# Patient Record
Sex: FEMALE | Race: WHITE | NOT HISPANIC OR LATINO | Employment: OTHER
[De-identification: names, ages, dates, MRNs, and addresses within clinical notes are randomized per-mention and may not be internally consistent; named-entity substitution may affect disease eponyms.]

---

## 2017-04-18 ENCOUNTER — RECORDS - HEALTHEAST (OUTPATIENT)
Dept: ADMINISTRATIVE | Facility: OTHER | Age: 62
End: 2017-04-18

## 2017-09-21 ENCOUNTER — RECORDS - HEALTHEAST (OUTPATIENT)
Dept: ADMINISTRATIVE | Facility: OTHER | Age: 62
End: 2017-09-21

## 2017-11-08 ENCOUNTER — HOSPITAL ENCOUNTER (OUTPATIENT)
Dept: MAMMOGRAPHY | Facility: CLINIC | Age: 62
Discharge: HOME | End: 2017-11-08
Attending: FAMILY MEDICINE

## 2017-11-08 DIAGNOSIS — Z12.31 VISIT FOR SCREENING MAMMOGRAM: ICD-10-CM

## 2017-11-13 ENCOUNTER — COMMUNICATION - HEALTHEAST (OUTPATIENT)
Dept: FAMILY MEDICINE | Facility: CLINIC | Age: 62
End: 2017-11-13

## 2017-11-13 ENCOUNTER — OFFICE VISIT - HEALTHEAST (OUTPATIENT)
Dept: FAMILY MEDICINE | Facility: CLINIC | Age: 62
End: 2017-11-13

## 2017-11-13 DIAGNOSIS — I47.10 SUPRAVENTRICULAR TACHYCARDIA (H): ICD-10-CM

## 2017-11-13 DIAGNOSIS — E78.5 HYPERLIPIDEMIA, UNSPECIFIED HYPERLIPIDEMIA TYPE: ICD-10-CM

## 2017-11-13 DIAGNOSIS — R00.2 PALPITATIONS: ICD-10-CM

## 2017-11-13 DIAGNOSIS — K21.9 GASTROESOPHAGEAL REFLUX DISEASE WITHOUT ESOPHAGITIS: ICD-10-CM

## 2017-11-13 DIAGNOSIS — G25.81 RESTLESS LEGS SYNDROME (RLS): ICD-10-CM

## 2017-11-13 DIAGNOSIS — Z00.00 ROUTINE GENERAL MEDICAL EXAMINATION AT A HEALTH CARE FACILITY: ICD-10-CM

## 2017-11-13 DIAGNOSIS — I10 ESSENTIAL HYPERTENSION: ICD-10-CM

## 2017-11-13 LAB
CHOLEST SERPL-MCNC: 169 MG/DL
FASTING STATUS PATIENT QL REPORTED: YES
HDLC SERPL-MCNC: 60 MG/DL
LDLC SERPL CALC-MCNC: 91 MG/DL
TRIGL SERPL-MCNC: 92 MG/DL

## 2017-11-13 ASSESSMENT — MIFFLIN-ST. JEOR: SCORE: 1265.48

## 2017-11-28 ENCOUNTER — COMMUNICATION - HEALTHEAST (OUTPATIENT)
Dept: ADMINISTRATIVE | Facility: CLINIC | Age: 62
End: 2017-11-28

## 2018-04-25 ENCOUNTER — RECORDS - HEALTHEAST (OUTPATIENT)
Dept: ADMINISTRATIVE | Facility: OTHER | Age: 63
End: 2018-04-25

## 2018-05-03 ENCOUNTER — COMMUNICATION - HEALTHEAST (OUTPATIENT)
Dept: FAMILY MEDICINE | Facility: CLINIC | Age: 63
End: 2018-05-03

## 2018-05-08 ENCOUNTER — OFFICE VISIT - HEALTHEAST (OUTPATIENT)
Dept: FAMILY MEDICINE | Facility: CLINIC | Age: 63
End: 2018-05-08

## 2018-05-08 DIAGNOSIS — C43.9 MELANOMA OF SKIN (H): ICD-10-CM

## 2018-05-08 DIAGNOSIS — Z01.818 ENCOUNTER FOR PREOPERATIVE EXAMINATION FOR GENERAL SURGICAL PROCEDURE: ICD-10-CM

## 2018-05-08 LAB
ATRIAL RATE - MUSE: 66 BPM
DIASTOLIC BLOOD PRESSURE - MUSE: NORMAL MMHG
HGB BLD-MCNC: 15 G/DL (ref 12–16)
INTERPRETATION ECG - MUSE: NORMAL
P AXIS - MUSE: 63 DEGREES
PR INTERVAL - MUSE: 150 MS
QRS DURATION - MUSE: 86 MS
QT - MUSE: 400 MS
QTC - MUSE: 419 MS
R AXIS - MUSE: 49 DEGREES
SYSTOLIC BLOOD PRESSURE - MUSE: NORMAL MMHG
T AXIS - MUSE: 70 DEGREES
VENTRICULAR RATE- MUSE: 66 BPM

## 2018-05-08 ASSESSMENT — MIFFLIN-ST. JEOR: SCORE: 1249.15

## 2018-07-19 ENCOUNTER — RECORDS - HEALTHEAST (OUTPATIENT)
Dept: ADMINISTRATIVE | Facility: OTHER | Age: 63
End: 2018-07-19

## 2018-07-23 ENCOUNTER — RECORDS - HEALTHEAST (OUTPATIENT)
Dept: ADMINISTRATIVE | Facility: OTHER | Age: 63
End: 2018-07-23

## 2018-09-21 ENCOUNTER — RECORDS - HEALTHEAST (OUTPATIENT)
Dept: ADMINISTRATIVE | Facility: OTHER | Age: 63
End: 2018-09-21

## 2018-10-09 ENCOUNTER — RECORDS - HEALTHEAST (OUTPATIENT)
Dept: ADMINISTRATIVE | Facility: OTHER | Age: 63
End: 2018-10-09

## 2018-10-17 ENCOUNTER — RECORDS - HEALTHEAST (OUTPATIENT)
Dept: ADMINISTRATIVE | Facility: OTHER | Age: 63
End: 2018-10-17

## 2018-10-24 ENCOUNTER — RECORDS - HEALTHEAST (OUTPATIENT)
Dept: ADMINISTRATIVE | Facility: OTHER | Age: 63
End: 2018-10-24

## 2018-11-14 ENCOUNTER — COMMUNICATION - HEALTHEAST (OUTPATIENT)
Dept: FAMILY MEDICINE | Facility: CLINIC | Age: 63
End: 2018-11-14

## 2018-11-14 ENCOUNTER — HOSPITAL ENCOUNTER (OUTPATIENT)
Dept: MAMMOGRAPHY | Facility: CLINIC | Age: 63
Discharge: HOME | End: 2018-11-14
Attending: FAMILY MEDICINE

## 2018-11-14 ENCOUNTER — OFFICE VISIT - HEALTHEAST (OUTPATIENT)
Dept: FAMILY MEDICINE | Facility: CLINIC | Age: 63
End: 2018-11-14

## 2018-11-14 DIAGNOSIS — Z12.31 VISIT FOR SCREENING MAMMOGRAM: ICD-10-CM

## 2018-11-14 DIAGNOSIS — Z00.00 ENCOUNTER FOR GENERAL ADULT MEDICAL EXAMINATION WITHOUT ABNORMAL FINDINGS: ICD-10-CM

## 2018-11-14 DIAGNOSIS — C44.90 MALIGNANT NEOPLASM OF SKIN: ICD-10-CM

## 2018-11-14 DIAGNOSIS — E78.5 HYPERLIPIDEMIA, UNSPECIFIED HYPERLIPIDEMIA TYPE: ICD-10-CM

## 2018-11-14 DIAGNOSIS — K21.9 GASTROESOPHAGEAL REFLUX DISEASE WITHOUT ESOPHAGITIS: ICD-10-CM

## 2018-11-14 DIAGNOSIS — R00.2 PALPITATIONS: ICD-10-CM

## 2018-11-14 DIAGNOSIS — I47.10 SUPRAVENTRICULAR TACHYCARDIA (H): ICD-10-CM

## 2018-11-14 DIAGNOSIS — C43.9 MELANOMA OF SKIN (H): ICD-10-CM

## 2018-11-14 DIAGNOSIS — C43.62 MALIGNANT MELANOMA OF LEFT UPPER EXTREMITY INCLUDING SHOULDER (H): ICD-10-CM

## 2018-11-14 DIAGNOSIS — Z13.220 ENCOUNTER FOR SCREENING FOR LIPOID DISORDERS: ICD-10-CM

## 2018-11-14 LAB
ALBUMIN SERPL-MCNC: 4.7 G/DL (ref 3.5–5)
ALP SERPL-CCNC: 81 U/L (ref 45–120)
ALT SERPL W P-5'-P-CCNC: 21 U/L (ref 0–45)
ANION GAP SERPL CALCULATED.3IONS-SCNC: 9 MMOL/L (ref 5–18)
AST SERPL W P-5'-P-CCNC: 30 U/L (ref 0–40)
BILIRUB SERPL-MCNC: 0.8 MG/DL (ref 0–1)
BUN SERPL-MCNC: 13 MG/DL (ref 8–22)
CALCIUM SERPL-MCNC: 10.2 MG/DL (ref 8.5–10.5)
CHLORIDE BLD-SCNC: 103 MMOL/L (ref 98–107)
CHOLEST SERPL-MCNC: 218 MG/DL
CO2 SERPL-SCNC: 27 MMOL/L (ref 22–31)
CREAT SERPL-MCNC: 0.72 MG/DL (ref 0.6–1.1)
FASTING STATUS PATIENT QL REPORTED: YES
GFR SERPL CREATININE-BSD FRML MDRD: >60 ML/MIN/1.73M2
GLUCOSE BLD-MCNC: 90 MG/DL (ref 70–125)
HDLC SERPL-MCNC: 73 MG/DL
LDLC SERPL CALC-MCNC: 130 MG/DL
POTASSIUM BLD-SCNC: 4.1 MMOL/L (ref 3.5–5)
PROT SERPL-MCNC: 7.4 G/DL (ref 6–8)
SODIUM SERPL-SCNC: 139 MMOL/L (ref 136–145)
TRIGL SERPL-MCNC: 77 MG/DL

## 2018-11-14 ASSESSMENT — MIFFLIN-ST. JEOR: SCORE: 1223.65

## 2019-01-16 ENCOUNTER — COMMUNICATION - HEALTHEAST (OUTPATIENT)
Dept: SCHEDULING | Facility: CLINIC | Age: 64
End: 2019-01-16

## 2019-01-23 ENCOUNTER — RECORDS - HEALTHEAST (OUTPATIENT)
Dept: ADMINISTRATIVE | Facility: OTHER | Age: 64
End: 2019-01-23

## 2019-04-23 ENCOUNTER — RECORDS - HEALTHEAST (OUTPATIENT)
Dept: ADMINISTRATIVE | Facility: OTHER | Age: 64
End: 2019-04-23

## 2019-10-23 ENCOUNTER — RECORDS - HEALTHEAST (OUTPATIENT)
Dept: ADMINISTRATIVE | Facility: OTHER | Age: 64
End: 2019-10-23

## 2019-10-24 ENCOUNTER — COMMUNICATION - HEALTHEAST (OUTPATIENT)
Dept: FAMILY MEDICINE | Facility: CLINIC | Age: 64
End: 2019-10-24

## 2019-10-24 DIAGNOSIS — R00.2 PALPITATIONS: ICD-10-CM

## 2019-10-24 DIAGNOSIS — E78.5 HYPERLIPIDEMIA, UNSPECIFIED HYPERLIPIDEMIA TYPE: ICD-10-CM

## 2019-10-24 DIAGNOSIS — I47.10 SUPRAVENTRICULAR TACHYCARDIA (H): ICD-10-CM

## 2019-11-15 ENCOUNTER — COMMUNICATION - HEALTHEAST (OUTPATIENT)
Dept: FAMILY MEDICINE | Facility: CLINIC | Age: 64
End: 2019-11-15

## 2019-11-15 ENCOUNTER — OFFICE VISIT - HEALTHEAST (OUTPATIENT)
Dept: FAMILY MEDICINE | Facility: CLINIC | Age: 64
End: 2019-11-15

## 2019-11-15 ENCOUNTER — HOSPITAL ENCOUNTER (OUTPATIENT)
Dept: MAMMOGRAPHY | Facility: CLINIC | Age: 64
Discharge: HOME | End: 2019-11-15
Attending: FAMILY MEDICINE

## 2019-11-15 DIAGNOSIS — G25.81 RESTLESS LEGS SYNDROME (RLS): ICD-10-CM

## 2019-11-15 DIAGNOSIS — Z12.31 VISIT FOR SCREENING MAMMOGRAM: ICD-10-CM

## 2019-11-15 DIAGNOSIS — C43.62 MALIGNANT MELANOMA OF LEFT UPPER EXTREMITY INCLUDING SHOULDER (H): ICD-10-CM

## 2019-11-15 DIAGNOSIS — J30.9 ALLERGIC RHINITIS, UNSPECIFIED SEASONALITY, UNSPECIFIED TRIGGER: ICD-10-CM

## 2019-11-15 DIAGNOSIS — R00.2 PALPITATIONS: ICD-10-CM

## 2019-11-15 DIAGNOSIS — C44.90 MALIGNANT NEOPLASM OF SKIN: ICD-10-CM

## 2019-11-15 DIAGNOSIS — C43.9 MELANOMA OF SKIN (H): ICD-10-CM

## 2019-11-15 DIAGNOSIS — Z78.0 POSTMENOPAUSAL: ICD-10-CM

## 2019-11-15 DIAGNOSIS — Z00.00 ROUTINE GENERAL MEDICAL EXAMINATION AT A HEALTH CARE FACILITY: ICD-10-CM

## 2019-11-15 DIAGNOSIS — I47.10 SUPRAVENTRICULAR TACHYCARDIA (H): ICD-10-CM

## 2019-11-15 DIAGNOSIS — K21.9 GASTROESOPHAGEAL REFLUX DISEASE WITHOUT ESOPHAGITIS: ICD-10-CM

## 2019-11-15 DIAGNOSIS — E78.5 HYPERLIPIDEMIA, UNSPECIFIED HYPERLIPIDEMIA TYPE: ICD-10-CM

## 2019-11-15 LAB
ALBUMIN SERPL-MCNC: 4.3 G/DL (ref 3.5–5)
ALP SERPL-CCNC: 75 U/L (ref 45–120)
ALT SERPL W P-5'-P-CCNC: 24 U/L (ref 0–45)
ANION GAP SERPL CALCULATED.3IONS-SCNC: 8 MMOL/L (ref 5–18)
AST SERPL W P-5'-P-CCNC: 27 U/L (ref 0–40)
BILIRUB SERPL-MCNC: 0.7 MG/DL (ref 0–1)
BUN SERPL-MCNC: 13 MG/DL (ref 8–22)
CALCIUM SERPL-MCNC: 9.5 MG/DL (ref 8.5–10.5)
CHLORIDE BLD-SCNC: 107 MMOL/L (ref 98–107)
CHOLEST SERPL-MCNC: 208 MG/DL
CO2 SERPL-SCNC: 27 MMOL/L (ref 22–31)
CREAT SERPL-MCNC: 0.75 MG/DL (ref 0.6–1.1)
FASTING STATUS PATIENT QL REPORTED: YES
GFR SERPL CREATININE-BSD FRML MDRD: >60 ML/MIN/1.73M2
GLUCOSE BLD-MCNC: 92 MG/DL (ref 70–125)
HDLC SERPL-MCNC: 57 MG/DL
LDLC SERPL CALC-MCNC: 122 MG/DL
POTASSIUM BLD-SCNC: 4.2 MMOL/L (ref 3.5–5)
PROT SERPL-MCNC: 6.7 G/DL (ref 6–8)
SODIUM SERPL-SCNC: 142 MMOL/L (ref 136–145)
TRIGL SERPL-MCNC: 144 MG/DL

## 2019-11-15 ASSESSMENT — MIFFLIN-ST. JEOR: SCORE: 1250.86

## 2019-11-18 ENCOUNTER — COMMUNICATION - HEALTHEAST (OUTPATIENT)
Dept: FAMILY MEDICINE | Facility: CLINIC | Age: 64
End: 2019-11-18

## 2019-11-18 LAB — HCV AB SERPL QL IA: NEGATIVE

## 2019-11-26 ENCOUNTER — RECORDS - HEALTHEAST (OUTPATIENT)
Dept: ADMINISTRATIVE | Facility: OTHER | Age: 64
End: 2019-11-26

## 2019-11-26 ENCOUNTER — RECORDS - HEALTHEAST (OUTPATIENT)
Dept: BONE DENSITY | Facility: CLINIC | Age: 64
End: 2019-11-26

## 2019-11-26 DIAGNOSIS — Z00.00 ENCOUNTER FOR GENERAL ADULT MEDICAL EXAMINATION WITHOUT ABNORMAL FINDINGS: ICD-10-CM

## 2019-11-26 DIAGNOSIS — Z78.0 ASYMPTOMATIC MENOPAUSAL STATE: ICD-10-CM

## 2019-12-02 ENCOUNTER — COMMUNICATION - HEALTHEAST (OUTPATIENT)
Dept: FAMILY MEDICINE | Facility: CLINIC | Age: 64
End: 2019-12-02

## 2020-03-25 ENCOUNTER — COMMUNICATION - HEALTHEAST (OUTPATIENT)
Dept: SCHEDULING | Facility: CLINIC | Age: 65
End: 2020-03-25

## 2020-03-25 ENCOUNTER — OFFICE VISIT - HEALTHEAST (OUTPATIENT)
Dept: FAMILY MEDICINE | Facility: CLINIC | Age: 65
End: 2020-03-25

## 2020-03-25 DIAGNOSIS — J01.00 ACUTE NON-RECURRENT MAXILLARY SINUSITIS: ICD-10-CM

## 2020-04-06 ENCOUNTER — COMMUNICATION - HEALTHEAST (OUTPATIENT)
Dept: FAMILY MEDICINE | Facility: CLINIC | Age: 65
End: 2020-04-06

## 2020-04-21 ENCOUNTER — RECORDS - HEALTHEAST (OUTPATIENT)
Dept: ADMINISTRATIVE | Facility: OTHER | Age: 65
End: 2020-04-21

## 2020-04-27 ENCOUNTER — COMMUNICATION - HEALTHEAST (OUTPATIENT)
Dept: FAMILY MEDICINE | Facility: CLINIC | Age: 65
End: 2020-04-27

## 2020-09-17 ENCOUNTER — OFFICE VISIT - HEALTHEAST (OUTPATIENT)
Dept: INTERNAL MEDICINE | Facility: CLINIC | Age: 65
End: 2020-09-17

## 2020-09-17 DIAGNOSIS — Z78.0 ASYMPTOMATIC POSTMENOPAUSAL STATUS: ICD-10-CM

## 2020-09-17 DIAGNOSIS — M62.89 MUSCLE FATIGUE: ICD-10-CM

## 2020-09-17 DIAGNOSIS — R11.0 NAUSEA: ICD-10-CM

## 2020-09-17 DIAGNOSIS — R11.2 INTRACTABLE VOMITING WITH NAUSEA, UNSPECIFIED VOMITING TYPE: ICD-10-CM

## 2020-09-17 DIAGNOSIS — R53.83 FATIGUE, UNSPECIFIED TYPE: ICD-10-CM

## 2020-09-17 DIAGNOSIS — R07.9 ACUTE CHEST PAIN: ICD-10-CM

## 2020-09-17 LAB
ALBUMIN SERPL-MCNC: 4.4 G/DL (ref 3.5–5)
ALP SERPL-CCNC: 79 U/L (ref 45–120)
ALT SERPL W P-5'-P-CCNC: 28 U/L (ref 0–45)
ANION GAP SERPL CALCULATED.3IONS-SCNC: 10 MMOL/L (ref 5–18)
AST SERPL W P-5'-P-CCNC: 32 U/L (ref 0–40)
BASOPHILS # BLD AUTO: 0 THOU/UL (ref 0–0.2)
BASOPHILS NFR BLD AUTO: 0 % (ref 0–2)
BILIRUB SERPL-MCNC: 0.6 MG/DL (ref 0–1)
BUN SERPL-MCNC: 13 MG/DL (ref 8–22)
C REACTIVE PROTEIN LHE: <0.1 MG/DL (ref 0–0.8)
CALCIUM SERPL-MCNC: 10 MG/DL (ref 8.5–10.5)
CHLORIDE BLD-SCNC: 103 MMOL/L (ref 98–107)
CK SERPL-CCNC: 85 U/L (ref 30–190)
CO2 SERPL-SCNC: 25 MMOL/L (ref 22–31)
CREAT SERPL-MCNC: 0.7 MG/DL (ref 0.6–1.1)
EOSINOPHIL # BLD AUTO: 0.2 THOU/UL (ref 0–0.4)
EOSINOPHIL NFR BLD AUTO: 3 % (ref 0–6)
ERYTHROCYTE [DISTWIDTH] IN BLOOD BY AUTOMATED COUNT: 11.1 % (ref 11–14.5)
ERYTHROCYTE [SEDIMENTATION RATE] IN BLOOD BY WESTERGREN METHOD: 6 MM/HR (ref 0–20)
GFR SERPL CREATININE-BSD FRML MDRD: >60 ML/MIN/1.73M2
GLUCOSE BLD-MCNC: 91 MG/DL (ref 70–125)
HCT VFR BLD AUTO: 42.2 % (ref 35–47)
HGB BLD-MCNC: 14.4 G/DL (ref 12–16)
LYMPHOCYTES # BLD AUTO: 1.5 THOU/UL (ref 0.8–4.4)
LYMPHOCYTES NFR BLD AUTO: 26 % (ref 20–40)
MCH RBC QN AUTO: 32.4 PG (ref 27–34)
MCHC RBC AUTO-ENTMCNC: 34.2 G/DL (ref 32–36)
MCV RBC AUTO: 95 FL (ref 80–100)
MONOCYTES # BLD AUTO: 0.4 THOU/UL (ref 0–0.9)
MONOCYTES NFR BLD AUTO: 7 % (ref 2–10)
NEUTROPHILS # BLD AUTO: 3.6 THOU/UL (ref 2–7.7)
NEUTROPHILS NFR BLD AUTO: 63 % (ref 50–70)
PLATELET # BLD AUTO: 246 THOU/UL (ref 140–440)
PMV BLD AUTO: 7.6 FL (ref 7–10)
POTASSIUM BLD-SCNC: 3.7 MMOL/L (ref 3.5–5)
PROT SERPL-MCNC: 7 G/DL (ref 6–8)
RBC # BLD AUTO: 4.45 MILL/UL (ref 3.8–5.4)
SODIUM SERPL-SCNC: 138 MMOL/L (ref 136–145)
TSH SERPL DL<=0.005 MIU/L-ACNC: 2.06 UIU/ML (ref 0.3–5)
WBC: 5.8 THOU/UL (ref 4–11)

## 2020-09-17 RX ORDER — ONDANSETRON 4 MG/1
4 TABLET, FILM COATED ORAL EVERY 8 HOURS PRN
Qty: 10 TABLET | Refills: 1 | Status: SHIPPED | OUTPATIENT
Start: 2020-09-17 | End: 2021-11-19

## 2020-09-18 ENCOUNTER — COMMUNICATION - HEALTHEAST (OUTPATIENT)
Dept: INTERNAL MEDICINE | Facility: CLINIC | Age: 65
End: 2020-09-18

## 2020-09-18 LAB
ANA SER QL: 0.3 U
ATRIAL RATE - MUSE: 75 BPM
B BURGDOR IGG+IGM SER QL: 0.19 INDEX VALUE
DIASTOLIC BLOOD PRESSURE - MUSE: NORMAL
INTERPRETATION ECG - MUSE: NORMAL
P AXIS - MUSE: 31 DEGREES
PR INTERVAL - MUSE: 130 MS
QRS DURATION - MUSE: 78 MS
QT - MUSE: 394 MS
QTC - MUSE: 439 MS
R AXIS - MUSE: 46 DEGREES
SYSTOLIC BLOOD PRESSURE - MUSE: NORMAL
T AXIS - MUSE: 64 DEGREES
VENTRICULAR RATE- MUSE: 75 BPM

## 2020-10-02 ENCOUNTER — OFFICE VISIT - HEALTHEAST (OUTPATIENT)
Dept: FAMILY MEDICINE | Facility: CLINIC | Age: 65
End: 2020-10-02

## 2020-10-02 DIAGNOSIS — R19.7 DIARRHEA, UNSPECIFIED TYPE: ICD-10-CM

## 2020-10-02 DIAGNOSIS — F43.22 ADJUSTMENT DISORDER WITH ANXIETY: ICD-10-CM

## 2020-10-02 RX ORDER — LORAZEPAM 0.5 MG/1
0.5 TABLET ORAL DAILY PRN
Qty: 20 TABLET | Refills: 0 | Status: SHIPPED | OUTPATIENT
Start: 2020-10-02 | End: 2021-11-19

## 2020-10-02 ASSESSMENT — ANXIETY QUESTIONNAIRES
2. NOT BEING ABLE TO STOP OR CONTROL WORRYING: SEVERAL DAYS
5. BEING SO RESTLESS THAT IT IS HARD TO SIT STILL: MORE THAN HALF THE DAYS
1. FEELING NERVOUS, ANXIOUS, OR ON EDGE: MORE THAN HALF THE DAYS
GAD7 TOTAL SCORE: 11
IF YOU CHECKED OFF ANY PROBLEMS ON THIS QUESTIONNAIRE, HOW DIFFICULT HAVE THESE PROBLEMS MADE IT FOR YOU TO DO YOUR WORK, TAKE CARE OF THINGS AT HOME, OR GET ALONG WITH OTHER PEOPLE: SOMEWHAT DIFFICULT
4. TROUBLE RELAXING: MORE THAN HALF THE DAYS
3. WORRYING TOO MUCH ABOUT DIFFERENT THINGS: SEVERAL DAYS
6. BECOMING EASILY ANNOYED OR IRRITABLE: SEVERAL DAYS
7. FEELING AFRAID AS IF SOMETHING AWFUL MIGHT HAPPEN: MORE THAN HALF THE DAYS

## 2020-10-02 ASSESSMENT — PATIENT HEALTH QUESTIONNAIRE - PHQ9: SUM OF ALL RESPONSES TO PHQ QUESTIONS 1-9: 16

## 2020-10-09 ENCOUNTER — OFFICE VISIT - HEALTHEAST (OUTPATIENT)
Dept: FAMILY MEDICINE | Facility: CLINIC | Age: 65
End: 2020-10-09

## 2020-10-09 DIAGNOSIS — Z23 ENCOUNTER FOR ADMINISTRATION OF VACCINE: ICD-10-CM

## 2020-10-09 DIAGNOSIS — Z79.899 MEDICATION MANAGEMENT: ICD-10-CM

## 2020-10-09 DIAGNOSIS — F41.9 ANXIETY: ICD-10-CM

## 2020-10-09 DIAGNOSIS — F43.21 GRIEF REACTION: ICD-10-CM

## 2020-10-09 ASSESSMENT — MIFFLIN-ST. JEOR: SCORE: 1221.72

## 2020-10-15 ENCOUNTER — COMMUNICATION - HEALTHEAST (OUTPATIENT)
Dept: FAMILY MEDICINE | Facility: CLINIC | Age: 65
End: 2020-10-15

## 2020-10-20 ENCOUNTER — COMMUNICATION - HEALTHEAST (OUTPATIENT)
Dept: FAMILY MEDICINE | Facility: CLINIC | Age: 65
End: 2020-10-20

## 2020-10-22 ENCOUNTER — COMMUNICATION - HEALTHEAST (OUTPATIENT)
Dept: FAMILY MEDICINE | Facility: CLINIC | Age: 65
End: 2020-10-22

## 2020-10-29 ENCOUNTER — COMMUNICATION - HEALTHEAST (OUTPATIENT)
Dept: FAMILY MEDICINE | Facility: CLINIC | Age: 65
End: 2020-10-29

## 2020-11-06 ENCOUNTER — COMMUNICATION - HEALTHEAST (OUTPATIENT)
Dept: FAMILY MEDICINE | Facility: CLINIC | Age: 65
End: 2020-11-06

## 2020-11-09 ENCOUNTER — OFFICE VISIT - HEALTHEAST (OUTPATIENT)
Dept: FAMILY MEDICINE | Facility: CLINIC | Age: 65
End: 2020-11-09

## 2020-11-09 DIAGNOSIS — F41.9 ANXIETY: ICD-10-CM

## 2020-11-09 DIAGNOSIS — I47.10 SUPRAVENTRICULAR TACHYCARDIA (H): ICD-10-CM

## 2020-11-09 DIAGNOSIS — R11.0 NAUSEA: ICD-10-CM

## 2020-11-09 DIAGNOSIS — R00.2 PALPITATIONS: ICD-10-CM

## 2020-11-09 ASSESSMENT — MIFFLIN-ST. JEOR: SCORE: 1196.32

## 2020-11-09 ASSESSMENT — ANXIETY QUESTIONNAIRES
4. TROUBLE RELAXING: NEARLY EVERY DAY
7. FEELING AFRAID AS IF SOMETHING AWFUL MIGHT HAPPEN: MORE THAN HALF THE DAYS
6. BECOMING EASILY ANNOYED OR IRRITABLE: SEVERAL DAYS
GAD7 TOTAL SCORE: 16
2. NOT BEING ABLE TO STOP OR CONTROL WORRYING: MORE THAN HALF THE DAYS
5. BEING SO RESTLESS THAT IT IS HARD TO SIT STILL: NEARLY EVERY DAY
3. WORRYING TOO MUCH ABOUT DIFFERENT THINGS: MORE THAN HALF THE DAYS
1. FEELING NERVOUS, ANXIOUS, OR ON EDGE: NEARLY EVERY DAY
IF YOU CHECKED OFF ANY PROBLEMS ON THIS QUESTIONNAIRE, HOW DIFFICULT HAVE THESE PROBLEMS MADE IT FOR YOU TO DO YOUR WORK, TAKE CARE OF THINGS AT HOME, OR GET ALONG WITH OTHER PEOPLE: SOMEWHAT DIFFICULT

## 2020-11-10 ENCOUNTER — AMBULATORY - HEALTHEAST (OUTPATIENT)
Dept: LAB | Facility: CLINIC | Age: 65
End: 2020-11-10

## 2020-11-10 ENCOUNTER — COMMUNICATION - HEALTHEAST (OUTPATIENT)
Dept: FAMILY MEDICINE | Facility: CLINIC | Age: 65
End: 2020-11-10

## 2020-11-10 DIAGNOSIS — R11.0 NAUSEA: ICD-10-CM

## 2020-11-11 ENCOUNTER — COMMUNICATION - HEALTHEAST (OUTPATIENT)
Dept: FAMILY MEDICINE | Facility: CLINIC | Age: 65
End: 2020-11-11

## 2020-11-11 LAB — H PYLORI AG STL QL IA: NEGATIVE

## 2020-11-12 ENCOUNTER — COMMUNICATION - HEALTHEAST (OUTPATIENT)
Dept: FAMILY MEDICINE | Facility: CLINIC | Age: 65
End: 2020-11-12

## 2020-11-13 ENCOUNTER — RECORDS - HEALTHEAST (OUTPATIENT)
Dept: ADMINISTRATIVE | Facility: OTHER | Age: 65
End: 2020-11-13

## 2020-11-18 ENCOUNTER — OFFICE VISIT - HEALTHEAST (OUTPATIENT)
Dept: FAMILY MEDICINE | Facility: CLINIC | Age: 65
End: 2020-11-18

## 2020-11-18 DIAGNOSIS — C43.62 MALIGNANT MELANOMA OF LEFT UPPER EXTREMITY INCLUDING SHOULDER (H): ICD-10-CM

## 2020-11-18 DIAGNOSIS — F43.21 GRIEF REACTION: ICD-10-CM

## 2020-11-18 DIAGNOSIS — Z79.899 MEDICATION MANAGEMENT: ICD-10-CM

## 2020-11-18 DIAGNOSIS — C44.90 MALIGNANT NEOPLASM OF SKIN: ICD-10-CM

## 2020-11-18 DIAGNOSIS — R00.2 PALPITATIONS: ICD-10-CM

## 2020-11-18 DIAGNOSIS — Z00.01 ENCOUNTER FOR GENERAL ADULT MEDICAL EXAMINATION WITH ABNORMAL FINDINGS: ICD-10-CM

## 2020-11-18 DIAGNOSIS — F41.9 ANXIETY: ICD-10-CM

## 2020-11-18 DIAGNOSIS — E78.5 HYPERLIPIDEMIA, UNSPECIFIED HYPERLIPIDEMIA TYPE: ICD-10-CM

## 2020-11-18 DIAGNOSIS — I47.10 SUPRAVENTRICULAR TACHYCARDIA (H): ICD-10-CM

## 2020-11-18 LAB
ALBUMIN SERPL-MCNC: 4.4 G/DL (ref 3.5–5)
ALP SERPL-CCNC: 62 U/L (ref 45–120)
ALT SERPL W P-5'-P-CCNC: 21 U/L (ref 0–45)
ANION GAP SERPL CALCULATED.3IONS-SCNC: 8 MMOL/L (ref 5–18)
AST SERPL W P-5'-P-CCNC: 22 U/L (ref 0–40)
BILIRUB SERPL-MCNC: 0.5 MG/DL (ref 0–1)
BUN SERPL-MCNC: 14 MG/DL (ref 8–22)
CALCIUM SERPL-MCNC: 9.5 MG/DL (ref 8.5–10.5)
CHLORIDE BLD-SCNC: 105 MMOL/L (ref 98–107)
CHOLEST SERPL-MCNC: 153 MG/DL
CO2 SERPL-SCNC: 26 MMOL/L (ref 22–31)
CREAT SERPL-MCNC: 0.75 MG/DL (ref 0.6–1.1)
FASTING STATUS PATIENT QL REPORTED: YES
GFR SERPL CREATININE-BSD FRML MDRD: >60 ML/MIN/1.73M2
GLUCOSE BLD-MCNC: 92 MG/DL (ref 70–125)
HDLC SERPL-MCNC: 52 MG/DL
LDLC SERPL CALC-MCNC: 65 MG/DL
POTASSIUM BLD-SCNC: 4.1 MMOL/L (ref 3.5–5)
PROT SERPL-MCNC: 6.5 G/DL (ref 6–8)
SODIUM SERPL-SCNC: 139 MMOL/L (ref 136–145)
TRIGL SERPL-MCNC: 180 MG/DL

## 2020-11-18 RX ORDER — SIMVASTATIN 20 MG
20 TABLET ORAL AT BEDTIME
Qty: 90 TABLET | Refills: 3 | Status: SHIPPED | OUTPATIENT
Start: 2020-11-18 | End: 2021-11-16

## 2020-11-18 RX ORDER — CITALOPRAM HYDROBROMIDE 10 MG/1
TABLET ORAL
Qty: 90 TABLET | Refills: 3 | Status: SHIPPED | OUTPATIENT
Start: 2020-11-18 | End: 2021-10-27

## 2020-11-18 RX ORDER — METOPROLOL SUCCINATE 25 MG/1
TABLET, EXTENDED RELEASE ORAL
Qty: 180 TABLET | Refills: 3 | Status: SHIPPED | OUTPATIENT
Start: 2020-11-18 | End: 2021-11-16

## 2020-11-18 ASSESSMENT — PATIENT HEALTH QUESTIONNAIRE - PHQ9: SUM OF ALL RESPONSES TO PHQ QUESTIONS 1-9: 8

## 2020-11-18 ASSESSMENT — MIFFLIN-ST. JEOR: SCORE: 1186.34

## 2020-11-19 ENCOUNTER — COMMUNICATION - HEALTHEAST (OUTPATIENT)
Dept: FAMILY MEDICINE | Facility: CLINIC | Age: 65
End: 2020-11-19

## 2020-11-19 ENCOUNTER — HOSPITAL ENCOUNTER (OUTPATIENT)
Dept: MAMMOGRAPHY | Facility: CLINIC | Age: 65
Discharge: HOME | End: 2020-11-19
Attending: FAMILY MEDICINE

## 2020-11-19 DIAGNOSIS — Z12.31 SCREENING MAMMOGRAM, ENCOUNTER FOR: ICD-10-CM

## 2021-01-20 ENCOUNTER — RECORDS - HEALTHEAST (OUTPATIENT)
Dept: ADMINISTRATIVE | Facility: OTHER | Age: 66
End: 2021-01-20

## 2021-01-25 ENCOUNTER — COMMUNICATION - HEALTHEAST (OUTPATIENT)
Dept: FAMILY MEDICINE | Facility: CLINIC | Age: 66
End: 2021-01-25

## 2021-05-13 ENCOUNTER — RECORDS - HEALTHEAST (OUTPATIENT)
Dept: ADMINISTRATIVE | Facility: OTHER | Age: 66
End: 2021-05-13

## 2021-05-26 ASSESSMENT — PATIENT HEALTH QUESTIONNAIRE - PHQ9: SUM OF ALL RESPONSES TO PHQ QUESTIONS 1-9: 16

## 2021-05-27 ASSESSMENT — PATIENT HEALTH QUESTIONNAIRE - PHQ9: SUM OF ALL RESPONSES TO PHQ QUESTIONS 1-9: 8

## 2021-05-28 ASSESSMENT — ANXIETY QUESTIONNAIRES
GAD7 TOTAL SCORE: 16
GAD7 TOTAL SCORE: 11

## 2021-05-31 VITALS — BODY MASS INDEX: 25.57 KG/M2 | WEIGHT: 159.1 LBS | HEIGHT: 66 IN

## 2021-06-01 VITALS — WEIGHT: 155.5 LBS | HEIGHT: 66 IN | BODY MASS INDEX: 24.99 KG/M2

## 2021-06-02 VITALS — HEIGHT: 65 IN | WEIGHT: 152.5 LBS | BODY MASS INDEX: 25.41 KG/M2

## 2021-06-02 NOTE — TELEPHONE ENCOUNTER
Refill Approved    Rx renewed per Medication Renewal Policy. Medication was last renewed on 11/14/18.    Lorie Moya, [facility] Triage/Med Refill 10/24/2019     Requested Prescriptions   Pending Prescriptions Disp Refills     simvastatin (ZOCOR) 20 MG tablet [Pharmacy Med Name: SIMVASTATIN TABS 20MG] 90 tablet 4     Sig: TAKE 1 TABLET AT BEDTIME       Statins Refill Protocol (Hmg CoA Reductase Inhibitors) Passed - 10/24/2019  2:29 AM        Passed - PCP or prescribing provider visit in past 12 months      Last office visit with prescriber/PCP: Visit date not found OR same dept: Visit date not found OR same specialty: Visit date not found  Last physical: 11/14/2018 Last MTM visit: Visit date not found   Next visit within 3 mo: Visit date not found  Next physical within 3 mo: Visit date not found  Prescriber OR PCP: Blanca Mace MD  Last diagnosis associated with med order: 1. Hyperlipidemia, unspecified hyperlipidemia type  - simvastatin (ZOCOR) 20 MG tablet [Pharmacy Med Name: SIMVASTATIN TABS 20MG]; TAKE 1 TABLET AT BEDTIME.  Dispense: 90 tablet; Refill: 4    2. Palpitations  - TOPROL XL 25 mg [Pharmacy Med Name: TOPROL XL TABS 25MG]; TAKE 1 TABLET TWICE A DAY  Dispense: 180 tablet; Refill: 4    3. Supraventricular tachycardia (H)  - TOPROL XL 25 mg [Pharmacy Med Name: TOPROL XL TABS 25MG]; TAKE 1 TABLET TWICE A DAY  Dispense: 180 tablet; Refill: 4    If protocol passes may refill for 12 months if within 3 months of last provider visit (or a total of 15 months).             metoprolol succinate (TOPROL XL) 25 MG [Pharmacy Med Name: TOPROL XL TABS 25MG] 180 tablet 4     Sig: TAKE 1 TABLET TWICE A DAY       Beta-Blockers Refill Protocol Passed - 10/24/2019  2:29 AM        Passed - PCP or prescribing provider visit in past 12 months or next 3 months     Last office visit with prescriber/PCP: Visit date not found OR same dept: Visit date not found OR same specialty: Visit date not found  Last physical:  11/14/2018 Last MTM visit: Visit date not found   Next visit within 3 mo: Visit date not found  Next physical within 3 mo: Visit date not found  Prescriber OR PCP: Blanca Mace MD  Last diagnosis associated with med order: 1. Hyperlipidemia, unspecified hyperlipidemia type  - simvastatin (ZOCOR) 20 MG tablet [Pharmacy Med Name: SIMVASTATIN TABS 20MG]; TAKE 1 TABLET AT BEDTIME.  Dispense: 90 tablet; Refill: 4    2. Palpitations  - TOPROL XL 25 mg [Pharmacy Med Name: TOPROL XL TABS 25MG]; TAKE 1 TABLET TWICE A DAY  Dispense: 180 tablet; Refill: 4    3. Supraventricular tachycardia (H)  - TOPROL XL 25 mg [Pharmacy Med Name: TOPROL XL TABS 25MG]; TAKE 1 TABLET TWICE A DAY  Dispense: 180 tablet; Refill: 4    If protocol passes may refill for 12 months if within 3 months of last provider visit (or a total of 15 months).             Passed - Blood pressure filed in past 12 months     BP Readings from Last 1 Encounters:   11/14/18 106/78

## 2021-06-03 ENCOUNTER — RECORDS - HEALTHEAST (OUTPATIENT)
Dept: SCHEDULING | Facility: CLINIC | Age: 66
End: 2021-06-03

## 2021-06-03 DIAGNOSIS — Z12.31 VISIT FOR SCREENING MAMMOGRAM: ICD-10-CM

## 2021-06-03 NOTE — PROGRESS NOTES
Assessment and Plan:   Annual wellness visit    1. Routine general medical examination at a health care facility     - DXA Bone Density Scan; Future  - Hepatitis C Antibody (Anti-HCV)    2. Hyperlipidemia, unspecified hyperlipidemia type     - Lipid Cascade  - Comprehensive Metabolic Panel  - simvastatin (ZOCOR) 20 MG tablet; Take 1 tablet (20 mg total) by mouth at bedtime.  Dispense: 90 tablet; Refill: 3    3. Postmenopausal     - DXA Bone Density Scan; Future    4. Palpitations     - metoprolol succinate (TOPROL XL) 25 MG; TAKE 1 TABLET TWICE A DAY  Dispense: 180 tablet; Refill: 3    5. Supraventricular tachycardia (H)     - metoprolol succinate (TOPROL XL) 25 MG; TAKE 1 TABLET TWICE A DAY  Dispense: 180 tablet; Refill: 3    6. Basal Cell Carcinoma Of The Skin       7. Malignant Melanoma Of The Skin       8. Restless Legs Syndrome       9. Gastroesophageal reflux disease without esophagitis       10. Malignant melanoma of left upper extremity including shoulder (H)       11. Allergic rhinitis, unspecified seasonality, unspecified trigger           The patient's current medical problems were reviewed.    I have had an Advance Directives discussion with the patient.  The following health maintenance schedule was reviewed with the patient and provided in printed form in the after visit summary:   Health Maintenance   Topic Date Due     HEPATITIS C SCREENING  1955     ZOSTER VACCINES (2 of 3) 02/23/2015     DXA SCAN  11/10/2018     TD 18+ HE  07/02/2019     MEDICARE ANNUAL WELLNESS VISIT  11/14/2019     PNEUMOCOCCAL IMMUNIZATION 65+ LOW/MEDIUM RISK (2 of 2 - PPSV23) 11/14/2019     MAMMOGRAM  11/14/2020     FALL RISK ASSESSMENT  11/15/2020     LIPID  11/14/2023     ADVANCE CARE PLANNING  11/14/2023     COLONOSCOPY  10/24/2028     INFLUENZA VACCINE RULE BASED  Completed        Subjective:   Chief Complaint: Ynes Mart is an 64 y.o. female here for an Annual Wellness visit.   HPI: She is doing well.  She  just saw her dermatologist whom she is seeing every 3 months after her diagnosis of malignant melanoma on her left upper arm.  She is a year and a half since her diagnosis.  After May, she will have a visit every 6 months.    She had her mammogram this morning    She sees her ophthalmologist in January so she deferred on the eye exam today    We did labs this morning and she will get her refills sent to her mail order pharmacy.    Review of Systems: A little bit of urinary leakage but not enough that she wants to do anything about it at this time.    She has some restless legs and she has been taking CBD oil once a day at night and she sleeps great and her restless legs are better.  She knows that purity and potency standards are not upheld with over-the-counter CBD oil and she is willing to take that risk that it might interact with her other medications.    Please see above.  The rest of the review of systems are negative for all systems.    Patient Care Team:  Blanca Mace MD as PCP - General  Blanca Mace MD as Assigned PCP     Patient Active Problem List   Diagnosis     Allergic Rhinitis     Basal Cell Carcinoma Of The Skin     Restless Legs Syndrome     Esophageal Reflux     Hyperlipidemia     Supraventricular tachycardia (H)     Palpitations     Malignant melanoma of upper extremity, including shoulder (H)     Past Medical History:   Diagnosis Date     Hyperlipidemia      Melanoma (H) 05/2018    Left Arm     SVT (supraventricular tachycardia) (H)       Past Surgical History:   Procedure Laterality Date     HYSTERECTOMY  1995     OOPHORECTOMY  1995     IA REMOVAL OF OVARY(S)      Description: Oophorectomy;  Recorded: 09/09/2013;  Comments: cyst on ovary and endometriosis     IA TOTAL ABDOM HYSTERECTOMY      Description: Total Abdominal Hysterectomy;  Proc Date: 01/01/1999;  Comments: Dr. Smith at [facility], [place], operated by [facility], Indication was fibroids and endometriosis      Family History   Problem  Relation Age of Onset     Heart disease Mother         MI @ 94     Hyperlipidemia Mother      Hypertension Mother      COPD Father      Heart disease Brother 66        massive MI     Diabetes Brother         type 2     Diabetes Brother         type 2     Diabetes Sister      Kidney disease Sister      Heart disease Sister      Ovarian cancer Maternal Aunt 80      Social History     Socioeconomic History     Marital status:      Spouse name: Not on file     Number of children: 2     Years of education: Not on file     Highest education level: Not on file   Occupational History     Occupation: retired   Social Needs     Financial resource strain: Not on file     Food insecurity:     Worry: Not on file     Inability: Not on file     Transportation needs:     Medical: Not on file     Non-medical: Not on file   Tobacco Use     Smoking status: Never Smoker     Smokeless tobacco: Never Used   Substance and Sexual Activity     Alcohol use: Yes     Alcohol/week: 2.0 standard drinks     Types: 2 Glasses of wine per week     Comment: occasional     Drug use: No     Sexual activity: Yes     Birth control/protection: Post-menopausal, Surgical   Lifestyle     Physical activity:     Days per week: Not on file     Minutes per session: Not on file     Stress: Not on file   Relationships     Social connections:     Talks on phone: Not on file     Gets together: Not on file     Attends Holiness service: Not on file     Active member of club or organization: Not on file     Attends meetings of clubs or organizations: Not on file     Relationship status: Not on file     Intimate partner violence:     Fear of current or ex partner: Not on file     Emotionally abused: Not on file     Physically abused: Not on file     Forced sexual activity: Not on file   Other Topics Concern     Not on file   Social History Narrative    Christian     retired [employer]      Current Outpatient Medications   Medication Sig Dispense Refill      "ascorbic acid (VITAMIN C) 1000 MG tablet Take 1,000 mg by mouth daily.       aspirin 81 MG EC tablet Take 81 mg by mouth daily.       calcium carbonate-vitamin D2 500 mg(1,250mg) -200 unit tablet Take 1 tablet by mouth 2 (two) times a day.       fluorouracil (EFUDEX) 5 % cream        fluticasone (FLONASE) 50 mcg/actuation nasal spray 2 sprays into each nostril daily.       Lactobacillus rhamnosus GG (CULTURELLE) 10-15 Billion cell capsule Take 1 capsule by mouth daily.       magnesium 30 mg tablet Take 30 mg by mouth 2 (two) times a day.       metoprolol succinate (TOPROL XL) 25 MG TAKE 1 TABLET TWICE A  tablet 3     multivitamin therapeutic (THERAGRAN) tablet Take 1 tablet by mouth daily.       OMEGA-3/DHA/EPA/FISH OIL (FISH OIL-OMEGA-3 FATTY ACIDS) 300-1,000 mg capsule Take 2 g by mouth daily.       omeprazole 20 mg TbEC Take by mouth Daily before breakfast.       simvastatin (ZOCOR) 20 MG tablet Take 1 tablet (20 mg total) by mouth at bedtime. 90 tablet 3     No current facility-administered medications for this visit.       Objective:   Vital Signs:   Visit Vitals  /78   Pulse 76   Ht 5' 4.75\" (1.645 m)   Wt 158 lb 8 oz (71.9 kg)   BMI 26.58 kg/m         VisionScreening:  No exam data present ; she deferred.  She will be seeing her eye care professional in January and will give us a copy of those reports.    PHYSICAL EXAM  General Appearance: Alert, cooperative, no distress, appears stated age  Head: Normocephalic, without obvious abnormality, atraumatic  Eyes: PERRL, conjunctiva/corneas clear, EOM's intact  Ears: Normal TM's and external ear canals, both ears  Nose: Nares normal, septum midline,mucosa normal, no drainage  Throat: Lips, mucosa, and tongue normal; teeth and gums normal  Neck: Supple, symmetrical, trachea midline, no adenopathy;  thyroid: not enlarged, symmetric, no tenderness/mass/nodules;    Back: Symmetric, no curvature, ROM normal, no CVA tenderness  Lungs: Clear to auscultation " bilaterally, respirations unlabored  Breasts: No breast masses, tenderness, asymmetry, or nipple discharge.  Heart: Regular rate and rhythm, S1 and S2 normal, no murmur, rub, or gallop, Abdomen: Soft, non-tender, bowel sounds active all four quadrants,  no masses, no organomegaly  Pelvic:Not examined  Extremities: Extremities normal, atraumatic, no cyanosis or edema  Skin: Skin color, texture, turgor normal, no rashes or lesions. Scar from melanoma removal left upper arm; new tattoo on her inner lower right arm  Lymph nodes: Cervical, supraclavicular, and axillary nodes normal  Neurologic: Normal     Assessment Results 11/15/2019   Activities of Daily Living No help needed   Instrumental Activities of Daily Living No help needed   Get Up and Go Score -   Mini Cog Total Score 5   Some recent data might be hidden     A Mini-Cog score of 0-2 suggests the possibility of dementia, score of 3-5 suggests no dementia    Identified Health Risks:     The patient was counseled and encouraged to consider modifying their diet and eating habits. She was provided with information on recommended healthy diet options.  Information on urinary incontinence and treatment options given to patient.  Patient's advanced directive was discussed and I am comfortable with the patient's wishes.

## 2021-06-04 VITALS
BODY MASS INDEX: 26.41 KG/M2 | HEIGHT: 65 IN | DIASTOLIC BLOOD PRESSURE: 78 MMHG | SYSTOLIC BLOOD PRESSURE: 120 MMHG | WEIGHT: 158.5 LBS | HEART RATE: 76 BPM

## 2021-06-05 VITALS
WEIGHT: 151.2 LBS | HEIGHT: 65 IN | SYSTOLIC BLOOD PRESSURE: 118 MMHG | DIASTOLIC BLOOD PRESSURE: 64 MMHG | HEART RATE: 78 BPM | BODY MASS INDEX: 25.19 KG/M2

## 2021-06-05 VITALS
SYSTOLIC BLOOD PRESSURE: 120 MMHG | DIASTOLIC BLOOD PRESSURE: 70 MMHG | HEIGHT: 65 IN | BODY MASS INDEX: 23.89 KG/M2 | WEIGHT: 143.4 LBS | HEART RATE: 78 BPM

## 2021-06-05 VITALS
BODY MASS INDEX: 24.26 KG/M2 | WEIGHT: 145.6 LBS | SYSTOLIC BLOOD PRESSURE: 110 MMHG | HEIGHT: 65 IN | HEART RATE: 88 BPM | DIASTOLIC BLOOD PRESSURE: 64 MMHG

## 2021-06-05 VITALS
WEIGHT: 150.6 LBS | SYSTOLIC BLOOD PRESSURE: 138 MMHG | DIASTOLIC BLOOD PRESSURE: 82 MMHG | BODY MASS INDEX: 25.26 KG/M2 | HEART RATE: 60 BPM

## 2021-06-05 VITALS
DIASTOLIC BLOOD PRESSURE: 84 MMHG | WEIGHT: 151.7 LBS | BODY MASS INDEX: 25.44 KG/M2 | HEART RATE: 60 BPM | SYSTOLIC BLOOD PRESSURE: 132 MMHG

## 2021-06-07 NOTE — TELEPHONE ENCOUNTER
RN Triage:   Symptoms of nasal congestion, drainage and sinus pressure.   Patient is calling in stating she has a chronic sinus infection tried mucinex, nyquil.  Pressure and pain in the eyes, teeth hurt, yellow mucus.   No fever, loose cough, bringing up phlegm. NO energy.   No shortness of breath or difficulty breathing     Advised a televisit within the next fewd days.   Home care suggestions reviewed with patient per RN protocol  Jia Benitez RN, BSN Care Connection Triage Nurse    Reason for Disposition    Patient wants to be seen    Protocols used: SINUS PAIN AND CONGESTION-A-OH

## 2021-06-07 NOTE — PROGRESS NOTES
"Ynes Mart is a 64 y.o. female who is being evaluated via a billable telephone visit.      The patient has been notified of following:     \"This telephone visit will be conducted via a call between you and your physician/provider. We have found that certain health care needs can be provided without the need for a physical exam.  This service lets us provide the care you need with a short phone conversation.  If a prescription is necessary we can send it directly to your pharmacy.  If lab work is needed we can place an order for that and you can then stop by our lab to have the test done at a later time.    If during the course of the call the physician/provider feels a telephone visit is not appropriate, you will not be charged for this service.\"         Ynes Mart complains of    Chief Complaint   Patient presents with     Facial Pain     x1 week, not improving - has been using mucinex and nightquil. pressure behind eyes.     Nasal Congestion     yellow nasal discharge      Headache       I have reviewed and updated the patient's Past Medical History, Social History, Family History and Medication List.    ALLERGIES  Patient has no known allergies.    Additional provider notes: The patient had a sinus infection when she was in [place] in February and was able to manage it with over-the-counter treatments.  Then she got influenza and got Tamiflu for that and now she feels as though she has a sinus infection again.  She is using her Flonase nasal spray and maximum dosage for Mucinex and she still just has a lot of pressure behind her eyes and she feels very tired and drained.    I suspect that a lot of this is still related to her being vulnerable because she had influenza.    I reviewed her meds and its been a long time since she has had an antibiotic so we decided to do amoxicillin 3 times a day for 10 days and add Afrin twice a day for 3 days only to her regimen to try to get some decongestant action " and drain some of the sinuses.  She is comfortable with that plan and will let me know if that does not help.    She also has a lump by her bra strap that she would like to have evaluated so we made an appointment for about 3 weeks from now hoping that the cold would 19 quarantine will be lifted and we could see her for a visit.  She is concerned because she has a history of melanoma we would need to take a look at this to make sure it is not lymphadenopathy.    Assessment/Plan:  1. Acute non-recurrent maxillary sinusitis     - amoxicillin (AMOXIL) 500 MG capsule; Take 1 capsule (500 mg total) by mouth 3 (three) times a day for 10 days.  Dispense: 30 capsule; Refill: 0        Phone call duration:  <10 minutes    Clint Gong, CMA

## 2021-06-07 NOTE — TELEPHONE ENCOUNTER
Called to inform Pt that  is at the [facility] and she wanted to reschedule the appointment to may we I did

## 2021-06-12 NOTE — PROGRESS NOTES
ASSESSMENT:  1. Grief reaction     - citalopram (CELEXA) 10 MG tablet; 1/2-1 tablet po at HS  Dispense: 30 tablet; Refill: 2    2. Medication management     - citalopram (CELEXA) 10 MG tablet; 1/2-1 tablet po at HS  Dispense: 30 tablet; Refill: 2    3. Anxiety     - citalopram (CELEXA) 10 MG tablet; 1/2-1 tablet po at HS  Dispense: 30 tablet; Refill: 2    4. Encounter for administration of vaccine     - Influenza,Quad,High Dose,PF 65 YR+           PLAN:  She is had a complete evaluation for some chest pains and nausea she is also had improvement of those symptoms.    Another provider prescribed Remeron for her at 15 mg and she felt really lousy after the first dose so she is been doing a half a dose for this week and she still feels out of it and foggy.  I have suggested she wean off of that and try a very low-dose citalopram.  For her already scheduled routine health care maintenance    Patient Instructions   Do not take remeron tonight; may take tomorrow night and Monday night and stop    Next Friday evening may start 1/2 tablet of the new medication and we will discuss at your physical      Orders Placed This Encounter   Procedures     Influenza,Quad,High Dose,PF 65 YR+     There are no discontinued medications.    No follow-ups on file.    CHIEF COMPLAINT:  Chief Complaint   Patient presents with     Anxiety     started Salo wants to follow up-- cant function        HISTORY OF PRESENT ILLNESS:  Ynes is a 64 y.o. female she reports that the COVID pandemic and being socially isolated has had a toll.  She said her mother  .  She was 92 years old and had gone to the hospital with a urinary tract infection, she got admitted and then evidently had a stroke when she was in the hospital so they discharged her and enrolled her in hospice and she  2 days later.  They were able to have a  for her.    1 month later her best friend  at age 62 of a pulmonary embolism.  They were able to have a  " for her.    1 week later her  had a severe asthma attack so she has been having lots of psychosocial stressors and she just is having difficulty coping.  She is not able to see her grandchildren and children as often as she would like or as she used to.    She said the nausea is better.  She is considering availing herself of a grief group through [facility] hospice who took care of her mother.        REVIEW OF SYSTEMS:       All other systems are negative.      TOBACCO USE:  Social History     Tobacco Use   Smoking Status Never Smoker   Smokeless Tobacco Never Used       VITALS:  Vitals:    10/09/20 1427   BP: 118/64   Patient Site: Right Arm   Patient Position: Sitting   Cuff Size: Adult Regular   Pulse: 78   Weight: 151 lb 3.2 oz (68.6 kg)   Height: 5' 5\" (1.651 m)     Wt Readings from Last 3 Encounters:   10/09/20 151 lb 3.2 oz (68.6 kg)   10/02/20 150 lb 9.6 oz (68.3 kg)   20 151 lb 11.2 oz (68.8 kg)       PHYSICAL EXAM:  Her affect is appropriate but she gets weepy during the interview.  I did not do a complete physical exam because she is coming back for her yearly physical in a month  I do not think that there is any other lab we need to do at this point.       MEDICATIONS:  Current Outpatient Medications   Medication Sig Dispense Refill     ascorbic acid (VITAMIN C) 1000 MG tablet Take 1,000 mg by mouth daily.       aspirin 81 MG EC tablet Take 81 mg by mouth daily.       calcium carbonate-vitamin D2 500 mg(1,250mg) -200 unit tablet Take 1 tablet by mouth 2 (two) times a day.       fluorouracil (EFUDEX) 5 % cream        fluticasone (FLONASE) 50 mcg/actuation nasal spray 2 sprays into each nostril daily.       Lactobacillus rhamnosus GG (CULTURELLE) 10-15 Billion cell capsule Take 1 capsule by mouth daily.       LORazepam (ATIVAN) 0.5 MG tablet Take 1 tablet (0.5 mg total) by mouth daily as needed for anxiety. 20 tablet 0     magnesium 30 mg tablet Take 30 mg by mouth 2 (two) times " a day.       metoprolol succinate (TOPROL XL) 25 MG TAKE 1 TABLET TWICE A  tablet 3     mirtazapine (REMERON) 15 MG tablet Take 1 tablet (15 mg total) by mouth at bedtime. 30 tablet 2     multivitamin therapeutic (THERAGRAN) tablet Take 1 tablet by mouth daily.       OMEGA-3/DHA/EPA/FISH OIL (FISH OIL-OMEGA-3 FATTY ACIDS) 300-1,000 mg capsule Take 2 g by mouth daily.       omeprazole 20 mg TbEC Take by mouth Daily before breakfast.       ondansetron (ZOFRAN) 4 MG tablet Take 1 tablet (4 mg total) by mouth every 8 (eight) hours as needed for nausea. 10 tablet 1     simvastatin (ZOCOR) 20 MG tablet Take 1 tablet (20 mg total) by mouth at bedtime. 90 tablet 3     citalopram (CELEXA) 10 MG tablet 1/2-1 tablet po at HS 30 tablet 2     No current facility-administered medications for this visit.

## 2021-06-12 NOTE — PATIENT INSTRUCTIONS - HE
Do not take remeron tonight; may take tomorrow night and Monday night and stop    Next Friday evening may start 1/2 tablet of the new medication and we will discuss at your physical

## 2021-06-12 NOTE — PROGRESS NOTES
"Chief complaint anxiety and nausea    HPI: The patient has had a lot of psychosocial stressors.  This summer her mother  in her good friend  but she was able to say goodbye and they were able to have funerals.    She states [place] cabin [place] and she was seen \"several times\" at the [facility] or [facility] and I do not have any access to what was done there if she has no idea what was done there.  The nausea has been pretty significant and then she has developed some anxiety.    She was seen by our nurse practitioner in internal medicine on  and a thorough medical evaluation was completed and no obvious cause was found.  She was seen on  with a family physician at the [facility] and he started her on Remeron.  A week later she was seen by me because she was having so many side effects from the Remeron.  I tried her on a lower dose of citalopram because she thought the anxiety was worse at the full dose of Remeron.  She has messaged me more frequently than every week trying to make adjustments in her medications.  4 days ago she increase the citalopram to 15 mg a day.    I set her up with a counselor through the [facility] in [facility].  I now think that because the anxiety is so overwhelming, she needs to have psychiatric medication management.    She will port that she will fall asleep okay and then she will wake up in the middle the night she will have anxiety and palpitations and sweats and nausea and some diarrhea and then she can get back to sleep.  She really feels miserable and feels as though she cannot function appropriately.    Objective:/64 (Patient Site: Right Arm, Patient Position: Sitting, Cuff Size: Adult Regular)   Pulse 88   Ht 5' 5\" (1.651 m)   Wt 145 lb 9.6 oz (66 kg)   BMI 24.23 kg/m    KENNETH-7 score is 16 and she feels completely overwhelmed.    I spent a lot of time reviewing the labs from " September 17 and we decided to add H pylori stool antigen tests today    Assessment: Persistent nausea  Overwhelming anxiety  Medication management    Plan: I will do stool antigen for H. pylori and I will get her an appointment through [facility] clinics in [place] for adult medication management.  She will go home and call them to make sure that everything is in order and see what else they go from a medication standpoint.  She already has lorazepam and she has been using that somewhat regularly and I am not comfortable continuing that medication at this time

## 2021-06-12 NOTE — PROGRESS NOTES
ASSESSMENT:  1. Diarrhea, unspecified type    2. Adjustment disorder with anxiety  - LORazepam (ATIVAN) 0.5 MG tablet; Take 1 tablet (0.5 mg total) by mouth daily as needed for anxiety.  Dispense: 20 tablet; Refill: 0  - mirtazapine (REMERON) 15 MG tablet; Take 1 tablet (15 mg total) by mouth at bedtime.  Dispense: 30 tablet; Refill: 2      PLAN:  I discussed with her the probable cause of the diarrhea which according to her does look like she is having some anxiety.  I discussed with her management of diarrhea at this point including medication that she is drinking a lot of fluid.  Encourage her to drink water which she has been doing but may be add some Gatorade which will add some electrolytes to the fluid.  Regarding the anxiety I think that she is still within the mourning that her symptoms period expected.  Discussed management and I will want to have her take something that is prolonged but something that she can take for a brief period of time.  Discussed lorazepam as well as some mirtazapine with her.  She will let us know if there is any worsening of her symptoms over the weekend but I do hope that she will benefit from where she is getting.  She can follow-up with me in a couple of months.    No orders of the defined types were placed in this encounter.    There are no discontinued medications.    No follow-ups on file.      CHIEF COMPLAINT:  Chief Complaint   Patient presents with     Nausea     x few weeks ago, lots of dizziness and weakness in the legs, feels like she may be dealing with some anxiety or depression as her mom recently passed away       HISTORY OF PRESENT ILLNESS:  Ynes is a 64 y.o. female presenting to the clinic today with concerns of having lot of diarrhea which is associated with nausea that she thinks might be as a result of anxiety.  She was seen about 2 weeks ago with the same symptoms and was evaluated.  Evaluation was normal.  Following that evaluation she felt slightly better  but noted that within the last couple of days she has been having problems again with nausea and diarrhea.  She noted that she can go to the bathroom up to 4-5 times and will still have runny stool.  She noted no abdominal pain.  But feels that before she has to go to the bathroom she will feel like something washed over her as if she is anxious and when that happens she will be going to the bathroom.  She had recently lost her mother and then within a week or so ago she lost a friend of hers.  She is unable to sleep very well, she has some reduced appetite.  She does not have any suicidal ideation at this time.  She has not had any problems with anxiety or depression in the past.    REVIEW OF SYSTEMS:   She denied having any lightheadedness except if she is about to have a diarrhea.  She does not really feel that she is very ill but feels weird.  She does not have any fevers and no chills.  No skin rash.  All other systems are negative.    PFSH:  Reviewed, as below.    Social History     Tobacco Use   Smoking Status Never Smoker   Smokeless Tobacco Never Used       Family History   Problem Relation Age of Onset     Heart disease Mother         MI @ 94     Hyperlipidemia Mother      Hypertension Mother      COPD Father      Heart disease Brother 66        massive MI     Diabetes Brother         type 2     Diabetes Brother         type 2     Diabetes Sister      Kidney disease Sister      Heart disease Sister      Ovarian cancer Maternal Aunt 80       Social History     Socioeconomic History     Marital status:      Spouse name: Not on file     Number of children: 2     Years of education: Not on file     Highest education level: Not on file   Occupational History     Occupation: retired   Social Needs     Financial resource strain: Not on file     Food insecurity     Worry: Not on file     Inability: Not on file     Transportation needs     Medical: Not on file     Non-medical: Not on file   Tobacco Use      Smoking status: Never Smoker     Smokeless tobacco: Never Used   Substance and Sexual Activity     Alcohol use: Yes     Alcohol/week: 2.0 standard drinks     Types: 2 Glasses of wine per week     Comment: occasional     Drug use: No     Sexual activity: Yes     Birth control/protection: Post-menopausal, Surgical   Lifestyle     Physical activity     Days per week: Not on file     Minutes per session: Not on file     Stress: Not on file   Relationships     Social connections     Talks on phone: Not on file     Gets together: Not on file     Attends Hoahaoism service: Not on file     Active member of club or organization: Not on file     Attends meetings of clubs or organizations: Not on file     Relationship status: Not on file     Intimate partner violence     Fear of current or ex partner: Not on file     Emotionally abused: Not on file     Physically abused: Not on file     Forced sexual activity: Not on file   Other Topics Concern     Not on file   Social History Narrative    Ep[employer]     retired [employer]       Past Surgical History:   Procedure Laterality Date     HYSTERECTOMY  1995     OOPHORECTOMY  1995     [place] REMOVAL OF OVARY(S)      Description: Oophorectomy;  Recorded: 09/09/2013;  Comments: cyst on ovary and endometriosis     [place] TOTAL ABDOM HYSTERECTOMY      Description: Total Abdominal Hysterectomy;  Proc Date: 01/01/1999;  Comments: Dr. Smith at [facility], Indication was fibroids and endometriosis       No Known Allergies    Active Ambulatory Problems     Diagnosis Date Noted     Allergic Rhinitis      Basal Cell Carcinoma Of The Skin      Restless Legs Syndrome      Esophageal Reflux      Hyperlipidemia      Supraventricular tachycardia (H)      Palpitations      Malignant melanoma of upper extremity, including shoulder (H) 05/07/2018     Resolved Ambulatory Problems     Diagnosis Date Noted     No Resolved Ambulatory Problems     Past Medical History:   Diagnosis Date      Melanoma (H) 05/2018     SVT (supraventricular tachycardia) (H)        Current Outpatient Medications   Medication Sig Dispense Refill     ascorbic acid (VITAMIN C) 1000 MG tablet Take 1,000 mg by mouth daily.       aspirin 81 MG EC tablet Take 81 mg by mouth daily.       calcium carbonate-vitamin D2 500 mg(1,250mg) -200 unit tablet Take 1 tablet by mouth 2 (two) times a day.       fluorouracil (EFUDEX) 5 % cream        fluticasone (FLONASE) 50 mcg/actuation nasal spray 2 sprays into each nostril daily.       Lactobacillus rhamnosus GG (CULTURELLE) 10-15 Billion cell capsule Take 1 capsule by mouth daily.       magnesium 30 mg tablet Take 30 mg by mouth 2 (two) times a day.       metoprolol succinate (TOPROL XL) 25 MG TAKE 1 TABLET TWICE A  tablet 3     multivitamin therapeutic (THERAGRAN) tablet Take 1 tablet by mouth daily.       OMEGA-3/DHA/EPA/FISH OIL (FISH OIL-OMEGA-3 FATTY ACIDS) 300-1,000 mg capsule Take 2 g by mouth daily.       omeprazole 20 mg TbEC Take by mouth Daily before breakfast.       simvastatin (ZOCOR) 20 MG tablet Take 1 tablet (20 mg total) by mouth at bedtime. 90 tablet 3     LORazepam (ATIVAN) 0.5 MG tablet Take 1 tablet (0.5 mg total) by mouth daily as needed for anxiety. 20 tablet 0     mirtazapine (REMERON) 15 MG tablet Take 1 tablet (15 mg total) by mouth at bedtime. 30 tablet 2     ondansetron (ZOFRAN) 4 MG tablet Take 1 tablet (4 mg total) by mouth every 8 (eight) hours as needed for nausea. 10 tablet 1     No current facility-administered medications for this visit.        VITALS:  Vitals:    10/02/20 1024   BP: 138/82   Patient Site: Left Arm   Patient Position: Sitting   Cuff Size: Adult Regular   Pulse: 60   Weight: 150 lb 9.6 oz (68.3 kg)     Wt Readings from Last 3 Encounters:   10/02/20 150 lb 9.6 oz (68.3 kg)   09/17/20 151 lb 11.2 oz (68.8 kg)   11/15/19 158 lb 8 oz (71.9 kg)     Body mass index is 25.26 kg/m .    PHYSICAL EXAM:  General Appearance: Alert, cooperative,  distressed and having some crying spells.  But she has a appropriate eye contact, and  seems to have good insight.  HEENT: Pupils are equal and reactive, extraocular motions is normal.   Neck is supple no notable thyromegaly.  External ears are normal.  Lungs: Clear to auscultation bilaterally, respirations unlabored  Heart: Regular rhythm and normal rate,S1 and S2 normal  Abdomen: Soft  Musculoskeletal: Normal range of motion.   Neurologic:  Alert and oriented times 3.   Psychiatric: She looks sad and tearful.  But she is able to articulate her concerns.  She is appropriately dressed.    MEDICATIONS:  Current Outpatient Medications   Medication Sig Dispense Refill     ascorbic acid (VITAMIN C) 1000 MG tablet Take 1,000 mg by mouth daily.       aspirin 81 MG EC tablet Take 81 mg by mouth daily.       calcium carbonate-vitamin D2 500 mg(1,250mg) -200 unit tablet Take 1 tablet by mouth 2 (two) times a day.       fluorouracil (EFUDEX) 5 % cream        fluticasone (FLONASE) 50 mcg/actuation nasal spray 2 sprays into each nostril daily.       Lactobacillus rhamnosus GG (CULTURELLE) 10-15 Billion cell capsule Take 1 capsule by mouth daily.       magnesium 30 mg tablet Take 30 mg by mouth 2 (two) times a day.       metoprolol succinate (TOPROL XL) 25 MG TAKE 1 TABLET TWICE A  tablet 3     multivitamin therapeutic (THERAGRAN) tablet Take 1 tablet by mouth daily.       OMEGA-3/DHA/EPA/FISH OIL (FISH OIL-OMEGA-3 FATTY ACIDS) 300-1,000 mg capsule Take 2 g by mouth daily.       omeprazole 20 mg TbEC Take by mouth Daily before breakfast.       simvastatin (ZOCOR) 20 MG tablet Take 1 tablet (20 mg total) by mouth at bedtime. 90 tablet 3     LORazepam (ATIVAN) 0.5 MG tablet Take 1 tablet (0.5 mg total) by mouth daily as needed for anxiety. 20 tablet 0     mirtazapine (REMERON) 15 MG tablet Take 1 tablet (15 mg total) by mouth at bedtime. 30 tablet 2     ondansetron (ZOFRAN) 4 MG tablet Take 1 tablet (4 mg total) by mouth  every 8 (eight) hours as needed for nausea. 10 tablet 1     No current facility-administered medications for this visit.

## 2021-06-13 NOTE — PROGRESS NOTES
Assessment and Plan:   Annual wellness visit  Patient has been advised of split billing requirements and indicates understandin. Encounter for general adult medical examination with abnormal findings  Mammogram scheduled    2. Palpitations  Many years ago; seen by Cardiology  - metoprolol succinate (TOPROL XL) 25 MG; TAKE 1 TABLET TWICE A DAY  Dispense: 180 tablet; Refill: 3    3. Supraventricular tachycardia (H)     - metoprolol succinate (TOPROL XL) 25 MG; TAKE 1 TABLET TWICE A DAY  Dispense: 180 tablet; Refill: 3    4. Hyperlipidemia, unspecified hyperlipidemia type     - Comprehensive Metabolic Panel  - Lipid Cascade  - simvastatin (ZOCOR) 20 MG tablet; Take 1 tablet (20 mg total) by mouth at bedtime.  Dispense: 90 tablet; Refill: 3    5. Grief reaction  Doing better finally  - citalopram (CELEXA) 10 MG tablet; 1/2-1 tablet po at HS  Dispense: 90 tablet; Refill: 3    6. Medication management  Seeing a psychiatric provider. Now medication is starting to work  - citalopram (CELEXA) 10 MG tablet; 1/2-1 tablet po at HS  Dispense: 90 tablet; Refill: 3    7. Anxiety     - citalopram (CELEXA) 10 MG tablet; 1/2-1 tablet po at HS  Dispense: 90 tablet; Refill: 3    8. Malignant melanoma of left upper extremity including shoulder (H)  Just saw Dermatologist    9. Basal Cell Carcinoma Of The Skin           The patient's current medical problems were reviewed.      The following health maintenance schedule was reviewed with the patient and provided in printed form in the after visit summary:   Health Maintenance   Topic Date Due     DEPRESSION ACTION PLAN  1955     ZOSTER VACCINES (3 of 3) 01/10/2020     Pneumococcal Vaccine: 65+ Years (2 of 2 - PPSV23) 11/15/2020     MAMMOGRAM  11/15/2021     MEDICARE ANNUAL WELLNESS VISIT  2021     FALL RISK ASSESSMENT  2021     DXA SCAN  2021     LIPID  11/15/2024     ADVANCE CARE PLANNING  11/15/2024     COLORECTAL CANCER SCREENING  10/24/2028     TD 18+  HE  11/15/2029     HEPATITIS C SCREENING  Completed     INFLUENZA VACCINE RULE BASED  Completed     Pneumococcal Vaccine: Pediatrics (0 to 5 Years) and At-Risk Patients (6 to 64 Years)  Aged Out     HEPATITIS B VACCINES  Aged Out        Subjective:   Chief Complaint: Ynes Mart is an 65 y.o. female here for an Annual Wellness visit.   HPI: Is here for her annual wellness visit.  She has been struggling significantly over the last 5 to 6 weeks with anxiety that is debilitating and has been very unnerving for her.  She had a metabolic work-up when it all started and there was no obvious inciting metabolic problem to precipitate this.  She did have grief reaction of losing her mother and her best friend this summer.  She was seen by another family medicine provider, she has been seen multiple times for me and I finally sent her to a mental health provider because of medication I was comfortable prescribing did not seem to be working for her.  That provider wanted to take her off her metoprolol and put her on propranolol.    I reviewed her records and over 10 years ago she was seen by cardiology with supraventricular tachycardia and palpitations and they chose the metoprolol so it is very specific for her for that problem so would not be appropriate to wean her off the metoprolol and put her on propranolol.  She will follow up with that psychiatric nurse practitioner but today she is feeling as though this medication is finally starting to work 5 weeks into treatment.  I told her she has not reached her steady state level yet.    She goes to the eye doctor, the dentist, and the dermatologist.    Review of Systems:     Please see above.  The rest of the review of systems are negative for all systems.    Patient Care Team:  Blanca Mace MD as PCP - General  Blanca Mace MD as Assigned PCP     Patient Active Problem List   Diagnosis     Allergic Rhinitis     Basal Cell Carcinoma Of The Skin     Restless  Legs Syndrome     Esophageal Reflux     Hyperlipidemia     Supraventricular tachycardia (H)     Palpitations     Malignant melanoma of upper extremity, including shoulder (H)     Past Medical History:   Diagnosis Date     Hyperlipidemia      Melanoma (H) 05/2018    Left Arm     SVT (supraventricular tachycardia) (H)       Past Surgical History:   Procedure Laterality Date     HYSTERECTOMY  1995     OOPHORECTOMY  1995     [place] REMOVAL OF OVARY(S)      Description: Oophorectomy;  Recorded: 09/09/2013;  Comments: cyst on ovary and endometriosis     [place] TOTAL ABDOM HYSTERECTOMY      Description: Total Abdominal Hysterectomy;  Proc Date: 01/01/1999;  Comments: Dr. Smith at [facility], Indication was fibroids and endometriosis      Family History   Problem Relation Age of Onset     Heart disease Mother         MI @ 94     Hyperlipidemia Mother      Hypertension Mother      COPD Father      Heart disease Brother 66        massive MI     Diabetes Brother         type 2     Diabetes Brother         type 2     Diabetes Sister      Kidney disease Sister      Heart disease Sister      Ovarian cancer Maternal Aunt 80      Social History     Socioeconomic History     Marital status:      Spouse name: Not on file     Number of children: 2     Years of education: Not on file     Highest education level: Not on file   Occupational History     Occupation: retired   Social Needs     Financial resource strain: Not on file     Food insecurity     Worry: Not on file     Inability: Not on file     Transportation needs     Medical: Not on file     Non-medical: Not on file   Tobacco Use     Smoking status: Never Smoker     Smokeless tobacco: Never Used   Substance and Sexual Activity     Alcohol use: Yes     Alcohol/week: 2.0 standard drinks     Types: 2 Glasses of wine per week     Comment: occasional     Drug use: No     Sexual activity: Yes     Birth control/protection: Post-menopausal, Surgical   Lifestyle      Physical activity     Days per week: Not on file     Minutes per session: Not on file     Stress: Not on file   Relationships     Social connections     Talks on phone: Not on file     Gets together: Not on file     Attends Latter day service: Not on file     Active member of club or organization: Not on file     Attends meetings of clubs or organizations: Not on file     Relationship status: Not on file     Intimate partner violence     Fear of current or ex partner: Not on file     Emotionally abused: Not on file     Physically abused: Not on file     Forced sexual activity: Not on file   Other Topics Concern     Not on file   Social History Narrative    [employer]     retired [employer]      Current Outpatient Medications   Medication Sig Dispense Refill     ascorbic acid (VITAMIN C) 1000 MG tablet Take 1,000 mg by mouth daily.       aspirin 81 MG EC tablet Take 81 mg by mouth daily.       citalopram (CELEXA) 10 MG tablet 1/2-1 tablet po at HS 30 tablet 2     fluticasone (FLONASE) 50 mcg/actuation nasal spray 2 sprays into each nostril daily.       Lactobacillus rhamnosus GG (CULTURELLE) 10-15 Billion cell capsule Take 1 capsule by mouth daily.       LORazepam (ATIVAN) 0.5 MG tablet Take 1 tablet (0.5 mg total) by mouth daily as needed for anxiety. 20 tablet 0     magnesium 30 mg tablet Take 30 mg by mouth 2 (two) times a day.       metoprolol succinate (TOPROL XL) 25 MG TAKE 1 TABLET TWICE A  tablet 3     multivitamin therapeutic (THERAGRAN) tablet Take 1 tablet by mouth daily.       OMEGA-3/DHA/EPA/FISH OIL (FISH OIL-OMEGA-3 FATTY ACIDS) 300-1,000 mg capsule Take 2 g by mouth daily.       omeprazole 20 mg TbEC Take by mouth Daily before breakfast.       ondansetron (ZOFRAN) 4 MG tablet Take 1 tablet (4 mg total) by mouth every 8 (eight) hours as needed for nausea. 10 tablet 1     simvastatin (ZOCOR) 20 MG tablet Take 1 tablet (20 mg total) by mouth at bedtime. 90 tablet 3     No current  "facility-administered medications for this visit.       Objective:   Vital Signs:   Visit Vitals  /70 (Patient Site: Right Arm, Patient Position: Sitting, Cuff Size: Adult Regular)   Pulse 78   Ht 5' 5\" (1.651 m)   Wt 143 lb 6.4 oz (65 kg)   BMI 23.86 kg/m           VisionScreening:  No exam data present     PHYSICAL EXAM  General Appearance: Alert, cooperative, no distress, appears stated age  Head: Normocephalic, without obvious abnormality, atraumatic  Eyes: PERRL, conjunctiva/corneas clear, EOM's intact  Ears: Normal TM's and external ear canals, both ears  Nose: Nares normal, septum midline,mucosa normal, no drainage  Throat:  Masked; dentist seen  Neck: Supple, symmetrical, trachea midline, no adenopathy;  thyroid: not enlarged, symmetric, no tenderness/mass/nodules;   Back: Symmetric, no curvature, ROM normal, no CVA tenderness  Lungs: Clear to auscultation bilaterally, respirations unlabored  Breasts:  Not completed  Heart: Regular rate and rhythm, S1 and S2 normal, no murmur, rub, or gallop, Abdomen: Soft, non-tender, bowel sounds active all four quadrants,  no masses, no organomegaly  Pelvic:Not examined  Extremities: Extremities normal, atraumatic, no cyanosis or edema  Skin: Skin color, texture, turgor normal, no rashes or lesions  Lymph nodes: Cervical, supraclavicular, and axillary nodes normal  Neurologic: Normal     Assessment Results 11/18/2020   Activities of Daily Living No help needed   Instrumental Activities of Daily Living No help needed   Get Up and Go Score Less than 12 seconds   Mini Cog Total Score 3   Some recent data might be hidden     A Mini-Cog score of 0-2 suggests the possibility of dementia, score of 3-5 suggests no dementia  Called 2 out of 3 words, did not use the analog clock face appropriately to tell the appropriate time.  He is not a fall risk.    Identified Health Risks:     The patient was counseled and encouraged to consider modifying their diet and eating habits. " She was provided with information on recommended healthy diet options.  Information on urinary incontinence and treatment options given to patient.  The patient was provided with suggestions to help her develop a healthy emotional lifestyle.   The patient's PHQ-9 score is consistent with mild depression. She  Will follow up Patient's advanced directive was discussed and I am comfortable with the patient's wishes.

## 2021-06-14 NOTE — PROGRESS NOTES
ASSESSMENT:  1. Routine general medical examination at a [facility] facility  Mammogram up to date, but will likely get 3D next year due to dense breasts    - Ambulatory referral for Colonoscopy    2. Palpitations  Well controlled with the beta-blocker    3. Supraventricular tachycardia     - Comprehensive Metabolic Panel    4. Hyperlipidemia, unspecified hyperlipidemia type     - Comprehensive Metabolic Panel  - Lipid Cascade  - simvastatin (ZOCOR) 20 MG tablet; TAKE 1 TABLET AT BEDTIME  Dispense: 90 tablet; Refill: 3     5. Gastroesophageal reflux disease without esophagitis  Discussed using Zantac instead of omeprazole if possible    6. Restless Legs Syndrome  Magnesium helps a lot         PLAN:  Patient Instructions   Try Zantac OTC  1-2 times a day instead of omeprazole (prilosec)      Orders Placed This Encounter   Procedures     Comprehensive Metabolic Panel     Lipid Cascade     Order Specific Question:   Fasting is required?     Answer:   Yes     Ambulatory referral for Colonoscopy     Referral Priority:   Routine     Referral Type:   Colonoscopy     Referral Reason:   Evaluation and Treatment     Referral Location:   [facility]     Requested Specialty:   Gastroenterology     Number of Visits Requested:   1     Medications Discontinued During This Encounter   Medication Reason     metoprolol succinate (TOPROL XL) 25 MG Reorder     simvastatin (ZOCOR) 20 MG tablet Reorder         Health Maintenance Due   Topic Date Due     COLONOSCOPY  03/06/2018       CHIEF COMPLAINT:  Chief Complaint   Patient presents with     Annual Exam     fasting labs, no concerns       HISTORY OF PRESENT ILLNESS:  Ynes Mart is a 62 y.o. female presenting to the clinic today for a physical exam.     We discussed omeprazole use and trying to decrease the proton pump inhibitor use and switch to an H2 blocker like Zantac.  If that is not effective if in managing her heartburn, she will then go back to the  omeprazole minimize the use as much as possible.    We talked a little bit about the shingles vaccine called Shingrix we do not have it available yet and that is in a 2 stage administration.    We talked about both of the pneumonia vaccines and how we would space those starting at age 65.    We discussed the colonoscopy and while both time she has had in the past were quite painful, she will consider doing that again because it will be a different provider.    Healthy Habits:   Patient reports regular exercise, dental and eye exams. Uses healthy diet. Always uses seatbelts. Reports uses medications as directed.  Alcohol: occasional  Smoking: none  Caffeine use: 3 coffees per day  Drug use: none  Birth control: post-menopausal    REVIEW OF SYSTEMS:   All other systems are negative.    Immunization History   Administered Date(s) Administered     Hep A, historic 2009, 2010     Influenza [id number], 2010     Influenza, inj, historic,unspecified 2000, 2016     Influenza, seasonal,quad inj 36+ mos 2015     Influenza,seasonal quad, PF, 36+MOS 2017     Influenza,seasonal, Inj IIV3 10/27/2003, 2005, 10/23/2006, 10/30/2008, 2009, 2011, 10/11/2012, 10/08/2013, 2014     Td,adult,historic,unspecified 2009     Tdap 2006, 2009     ZOSTER 2014       GYNECOLOGIC HISTORY:  Last menstrual period: post-menopausal  Contraception: post-menopausal  Last Pap: n/a Results were: n/a  Last mammogram: 15  Results were: normal    OB History      Para Term  AB Living    2 2 2   2    SAB TAB Ectopic Multiple Live Births                  PFSH:     History   Smoking Status     Never Smoker   Smokeless Tobacco     Never Used     Family History   Problem Relation Age of Onset     Heart disease Mother      MI @ 94     Hyperlipidemia Mother      Hypertension Mother      COPD Father      Heart disease Brother 66     massive MI     Diabetes Brother   "    type 1     Diabetes Sister      Kidney disease Sister      Heart disease Sister      Ovarian cancer Maternal Aunt 80     Social History     Social History     Marital status:      Spouse name: N/A     Number of children: 2     Years of education: N/A     Occupational History     retired      Social History Main Topics     Smoking status: Never Smoker     Smokeless tobacco: Never Used     Alcohol use 1.2 oz/week     2 Glasses of wine per week      Comment: occasional     Drug use: No     Sexual activity: Yes     Birth control/ protection: Post-menopausal, Surgical     Other Topics Concern     None     Social History Narrative    Buddhist     retired [employer]     Past Surgical History:   Procedure Laterality Date     HYSTERECTOMY  1995     OOPHORECTOMY  1995     DE REMOVAL OF OVARY(S)      Description: Oophorectomy;  Recorded: 09/09/2013;  Comments: cyst on ovary and endometriosis     DE TOTAL ABDOM HYSTERECTOMY      Description: Total Abdominal Hysterectomy;  Proc Date: 01/01/1999;  Comments: Dr. Smith at [facility], Indication was fibroids and endometriosis     No Known Allergies  Active Ambulatory Problems     Diagnosis Date Noted     Allergic Rhinitis      Basal Cell Carcinoma Of The Skin      Malignant Melanoma Of The Skin      Restless Legs Syndrome      Esophageal Reflux      Hyperlipidemia      Supraventricular tachycardia      Palpitations      Resolved Ambulatory Problems     Diagnosis Date Noted     No Resolved Ambulatory Problems     Past Medical History:   Diagnosis Date     Hyperlipidemia      SVT (supraventricular tachycardia)        VITALS:  Vitals:    11/13/17 0817   BP: 108/72   Patient Site: Right Arm   Patient Position: Sitting   Cuff Size: Adult Regular   Pulse: 64   Weight: 159 lb 1.6 oz (72.2 kg)   Height: 5' 5.5\" (1.664 m)     BP Readings from Last 3 Encounters:   11/13/17 108/72   11/10/16 128/82   09/14/15 112/70     Wt Readings from Last 3 Encounters: "   11/13/17 159 lb 1.6 oz (72.2 kg)   11/10/16 158 lb 1.6 oz (71.7 kg)   09/14/15 163 lb 9.6 oz (74.2 kg)     Body mass index is 26.07 kg/(m^2).    PHYSICAL EXAM:  General Appearance: Alert, cooperative, no distress, appears stated age  Head: Normocephalic, without obvious abnormality, atraumatic  Eyes: PERRL, conjunctiva/corneas clear, EOM's intact  Ears: Normal TM's and external ear canals, both ears  Nose: Nares normal, septum midline,mucosa normal, no drainage  Throat: Lips, mucosa, and tongue normal; teeth and gums normal  Neck: Supple, symmetrical, trachea midline, no adenopathy;  thyroid: not enlarged, symmetric, no tenderness/mass/nodules   Back: Symmetric, no curvature, ROM normal, no CVA tenderness  Lungs: Clear to auscultation bilaterally, respirations unlabored  Breasts: No breast masses, tenderness, asymmetry, or nipple discharge.  Heart: Regular rate and rhythm, S1 and S2 normal, no murmur, rub, or gallop, Abdomen: Soft, non-tender, bowel sounds active all four quadrants,  no masses, no organomegaly  Pelvic:Not examined  Extremities: Extremities normal, atraumatic, no cyanosis or edema  Skin: Skin color, texture, turgor normal, no rashes or lesions  Lymph nodes: Cervical, supraclavicular, and axillary nodes normal  Neurologic: Normal       MEDICATIONS:  Current Outpatient Prescriptions   Medication Sig Dispense Refill     ascorbic acid (VITAMIN C) 1000 MG tablet Take 1,000 mg by mouth daily.       aspirin 81 MG EC tablet Take 81 mg by mouth daily.       calcium carbonate-vitamin D2 500 mg(1,250mg) -200 unit tablet Take 1 tablet by mouth 2 (two) times a day.       Lactobacillus rhamnosus GG (CULTURELLE) 10-15 Billion cell capsule Take 1 capsule by mouth daily.       magnesium 30 mg tablet Take 30 mg by mouth 2 (two) times a day.       metoprolol succinate (TOPROL XL) 25 MG TAKE 1 TABLET TWICE A  tablet 3     multivitamin therapeutic (THERAGRAN) tablet Take 1 tablet by mouth daily.        OMEGA-3/DHA/EPA/FISH OIL (FISH OIL-OMEGA-3 FATTY ACIDS) 300-1,000 mg capsule Take 2 g by mouth daily.       omeprazole 20 mg TbEC Take by mouth Daily before breakfast.       simvastatin (ZOCOR) 20 MG tablet TAKE 1 TABLET AT BEDTIME 90 tablet 3     AFLURIA [id number], PF, 45 mcg (15 mcg x 3)/0.5 mL Syrg        No current facility-administered medications for this visit.

## 2021-06-16 PROBLEM — C43.60: Status: ACTIVE | Noted: 2018-05-07

## 2021-06-17 NOTE — PATIENT INSTRUCTIONS - HE
Patient Instructions by Blanca Mace MD at 11/15/2019  8:10 AM     Author: Blanca Mace MD Service: -- Author Type: Physician    Filed: 11/15/2019  8:19 AM Encounter Date: 11/15/2019 Status: Addendum    : Blanca Mace MD (Physician)    Related Notes: Original Note by Blanca Mace MD (Physician) filed at 11/15/2019  8:18 AM         Patient Education   Understanding USDA MyPlate  The USDA (US Department of Agriculture) has guidelines to help you make healthy food choices. These are called MyPlate. MyPlate shows the food groups that make up healthy meals using the image of a place setting. Before you eat, think about the healthiest choices for what to put onto your plate or into your cup or bowl. To learn more about building a healthy plate, visit www.choosemyplate.gov.       The Food Groups    Fruits: Any fruit or 100% fruit juice counts as part of the Fruit Group. Fruits may be fresh, canned, frozen, or dried, and may be whole, cut-up, or pureed. Make half your plate fruits and vegetables.    Vegetables: Any vegetable or 100% vegetable juice counts as a member of the Vegetable Group. Vegetables may be fresh, frozen, canned, or dried. They can be served raw or cooked and may be whole, cut-up, or mashed. Make half your plate fruits and vegetables.     Grains: All foods made from grains are part of the Grains Group. These include wheat, rice, oats, cornmeal, and barley such as bread, pasta, oatmeal, cereal, tortillas, and grits. Grains should be no more than a quarter of your plate. At least half of your grains should be whole grains.    Protein: This group includes meat, poultry, seafood, beans and peas, eggs, processed soy products (like tofu), nuts (including nut butters), and seeds. Make protein choices no more than a quarter of your plate. Meat and poultry choices should be lean or low fat.    Dairy: All fluid milk products and foods made from milk that contain calcium, like yogurt and  cheese are part of the Dairy Group. (Foods that have little calcium, such as cream, butter, and cream cheese, are not part of the group.) Most dairy choices should be low-fat or fat-free.    Oils: These are fats that are liquid at room temperature. They include canola, corn, olive, soybean, and sunflower oil. Foods that are mainly oil include mayonnaise, certain salad dressings, and soft margarines. You should have only 5 to 7 teaspoons of oils a day. You probably already get this much from the food you eat.  Use [employer] to Help Build Your Meals  The [employer] can help you plan and track your meals and activity. You can look up individual foods to see or compare their nutritional value. You can get guidelines for what and how much you should eat. You can compare your food choices. And you can assess personal physical activities and see ways you can improve. Go to www.[place] me.gov/Garden Pricecker/.    5219-7140 [employer]. [address] [place] [address]. All rights reserved. This information is not intended as a substitute for professional medical care. Always follow your healthcare professional's instructions.           Patient Education   Urinary Incontinence, Female (Adult)  Urinary incontinence means loss of control of the bladder. This problem affects many women, especially as they get older. If you have incontinence, you may be embarrassed to ask for help. But know that this problem can be treated.  Types of Incontinence  There are different types of incontinence. Two of the main types are described here. You can have more than one type.    Stress incontinence. With this type, urine leaks when pressure (stress) is put on the bladder. This may happen when you cough, sneeze, or laugh. Stress incontinence most often occurs because the pelvic floor muscles that support the bladder and urethra are weak. This can happen after pregnancy and vaginal childbirth or a hysterectomy. It  can also be due to excess body weight or hormone changes.    Urge incontinence (also called overactive bladder). With this type, a sudden urge to urinate is felt often. This may happen even though there may not be much urine in the bladder. The need to urinate often during the night is common. Urge incontinence most often occurs because of bladder spasms. This may be due to bladder irritation or infection. Damage to bladder nerves or pelvic muscles, constipation, and certain medicines can also lead to urge incontinence.  Treatment of urinary incontinence depends on the cause. Further evaluation is needed to find the type you have. This will likely include an exam and certain tests. Based on the results, you and your healthcare provider can then plan treatment. Until a diagnosis is made, the home care tips below can help relieve symptoms.  Home care    Do pelvic floor muscle exercises, if they are prescribed. The pelvic floor muscles help support the bladder and urethra. Many women find that their symptoms improve when doing special exercises that strengthen these muscles. To do the exercises contract the muscles you would use to stop your stream of urine, but do this when youre not urinating. Hold for 10 seconds, then relax. Repeat 10 to 20 times in a row, at least 3 times a day. Your provider may give you other instructions for how to do the exercises and how often.    Keep a bladder diary. This helps track how often and how much you urinate over a set period of time. Bring this diary with you to your next visit with the provider. The information can help your provider learn more about your bladder problem.    Lose weight, if advised to by your provider. Excess weight puts pressure on the bladder. Your provider can help you create a weight-loss plan thats right for you. This may include exercising more and making certain diet changes.    Don't consume foods and drinks that may irritate the bladder. These can  include alcohol and caffeinated drinks.    Quit smoking. Smoking and other tobacco use can lead to chronic cough that strains the pelvic floor muscles. Smoking may also damage the bladder and urethra. Talk with your provider about treatments or methods you can use to quit smoking.    If drinking large amounts of fluid causes you to have symptoms, you may be advised to limit your fluid intake. You may also be advised to drink most of your fluids during the day and to limit fluids at night.    If youre worried about urine leakage or accidents, you may wear absorbent pads to catch urine. Change the pads often. This helps reduce discomfort. It may also reduce the risk of skin or bladder infections.  Follow-up care  Follow up with your healthcare provider, or as directed. It may take some to find the right treatment for your problem. Your treatment plan may include special therapies or medicines. Certain procedures or surgery may also be options. Be sure to discuss any questions you have with your provider.  When to seek medical advice  Call the healthcare provider right away if any of these occur:    Fever of 100.4 F (38 C) or higher, or as directed by your provider    Bladder pain or fullness    Abdominal swelling    Nausea or vomiting    Back pain    Weakness, dizziness or fainting  Date Last Reviewed: 10/1/2017    5273-8864 [employer]. [address] [place] [address], [place] [address]. All rights reserved. This information is not intended as a substitute for professional medical care. Always follow your healthcare professional's instructions.     Patient Education   Treating Incontinence in Women: Nonsurgical Methods    The best treatment for you will depend on the type of incontinence you have. Your symptoms, age, and any underlying problems that are found also affect your treatment. While some types of incontinence may eventually require surgery, nonsurgical treatments may be effective in many cases.  Nonsurgical treatments include lifestyle changes, muscle-strengthening exercises, and medicines.  Nonsurgical Treatments  Treatment for stress urinary incontinence includes:    Bladder training    Lifestyle changes such as weight loss and increased activity if incontinence is due to being overweight    Medicines, if bladder training has not helped    Pelvic floor muscle exercises  Lifestyle changes    Losing weight. Excess weight puts extra pressure on the pelvic floor muscles. Exercising and eating right can help you lose weight. This helps other treatments work better.    Making certain diet changes. Some foods may make you need to urinate more, so it may be good to avoid them. These include caffeinated drinks and alcohol. Ask your healthcare provider whether these or other diet changes might be helpful.    Quitting smoking. Smoking can lead to a chronic cough that strains pelvic floor muscles. Smoking may also damage the bladder and urethra.  Pelvic floor muscle exercises  There are exercises you can do to help strengthen your pelvic floor muscles. The pelvic floor muscles act as a sling to help hold the bladder and urethra in place. These muscles also help keep the urethra closed. Weak pelvic floor muscles may allow urine to leak. To strengthen the pelvic floor muscles, do the exercises daily. In a few months, the muscles will be stronger and tighter. This can help prevent urine leakage.  Date Last Reviewed: 1/1/2017 2000-2017 [employer]. [address] [place] [address], [place] [address]. All rights reserved. This information is not intended as a substitute for professional medical care. Always follow your healthcare professional's instructions.     Patient Education   Treating Incontinence in Women: Special Therapies     During biofeedback, sensors send signals from your pelvic floor muscles to a computer screen.     Your doctor will discuss your options for treating your urinary incontinence. These  depend on the cause of your problem and any other health issues you have. Usually behavioral changes are tried first, followed by various medicines. If these methods are unsuccessful, one or more of the therapies described below may be part of your treatment plan.  Biofeedback  This technique is taught by a nurse or physical therapist. During the therapy, a small sensor is placed in your vagina or rectum. Another sensor is placed on your stomach. Other types of sensors are also available. These sensors read signals from the pelvic floor muscles. When you contract or relax your muscles, these signals are shown as images on a computer screen. Using the images, you can learn to relax or contract certain muscles. This can help you strengthen and better control these muscles. And it can help you learn pelvic floor muscle exercises.  Electrical stimulation  This is a painless therapy that uses a tiny amount of electric current. It helps strengthen very weak or damaged pelvic floor muscles. The electric current is sent through the muscles of the pelvic floor and bladder. This causes the muscles to contract. In time, this helps make the muscles stronger.  Stimulator implants  This technique is used to treat urge incontinence. A small device is implanted under the skin near the stomach. This device gives off mild electrical signals. These block extra signals that are being sent to the bladder muscle. This helps the bladder work more normally.  Date Last Reviewed: 1/1/2017 2000-2017 [employer]. [address] [place] [address], [place] [id number]. All rights reserved. This information is not intended as a substitute for professional medical care. Always follow your healthcare professional's instructions.     Patient Education   Treating Incontinence in Women with Medicine    Urinary incontinence is the leaking of urine from the bladder. In some cases, medicine can reduce or stop the leaking. It is mainly given for urge  incontinence. Your healthcare provider will talk with you about your options. Make sure to ask what side effects to expect.  Below are some types of medicines that may help with urge incontinence.  Types of medicine    Anticholinergics. These may increase how much urine the bladder can hold. They may also help relax bladder muscles.    Estrogen. This may help improve muscle tone in the urethra and bladder.    Antibiotics. These are used to treat urinary tract infections.    Botulinum toxin. Injection of botulinum toxin into the bladder muscle is an option when other medicines are not effective.  Tips for taking medicine    Take your medicine on time and as your healthcare provider tell you to.    Tell your healthcare provider if you have any side effects, your dosage may be adjusted if necessary.    Be patient. It may take time to find the right dose for you.    Keep a list of the medicines you take. Show it to your healthcare provider and pharmacist before you buy over-the-counter medicines.   Date Last Reviewed: 1/1/2017 2000-2017 [employer]. [address] [place] [address]. All rights reserved. This information is not intended as a substitute for professional medical care. Always follow your healthcare professional's instructions.     Patient Education     Kegel Exercises  Kegel exercises dont need special clothing or equipment. Theyre easy to learn and simple to do. And if you do them right, no one can tell youre doing them, so they can be done almost anywhere. Your healthcare provider, nurse, or physical therapist can answer any questions you have and help you get started.    A weak pelvic floor  The pelvic floor muscles may weaken due to aging, pregnancy and vaginal childbirth, injury, surgery, chronic cough, or lack of exercise. If the pelvic floor is weak, your bladder and other pelvic organs may sag out of place. The urethra may also open too easily and allow urine to leak out.  Kegel exercises can help you strengthen your pelvic floor muscles. Then they can better support the pelvic organs and control urine flow.  How Kegel exercises are done  Try each of the Kegel exercises described below. When youre doing them, try not to move your leg, buttock, or stomach muscles:    Contract as if you were stopping your urine stream. But do it when youre not urinating.    Tighten your rectum as if trying not to pass gas. Contract your anus, but dont move your buttocks.    You may place a finger or 2 in the vagina and squeeze your finger with your vagina to learn which muscles to tighten.  Try to hold each Kegel for a slow count to 5. You probably wont be able to hold them for that long at first. But keep practicing. It will get easier as your pelvic floor gets stronger. Eventually, special weights that you place in your vagina may be recommended to help make your Kegels even more effective. Visit your healthcare provider if you have difficulties doing Kegel exercises.  Helpful hints  Here are some tips to follow:    Do your Kegels as often as you can. The more you do them, the faster youll feel the results.    Pick an activity you do often as a reminder. For instance, do your Kegels every time you sit down.    Tighten your pelvic floor before you sneeze, get up from a chair, cough, laugh, or lift. This protects your pelvic floor from injury and can help prevent urine leakage.   Date Last Reviewed: 10/1/2017    2031-8695 [employer]. [address] [place] [address], [place] [address]. All rights reserved. This information is not intended as a substitute for professional medical care. Always follow your healthcare professional's instructions.       Advance Directive  Patients advance directive was discussed and I am comfortable with the patients wishes.  Patient Education   Personalized Prevention Plan  You are due for the preventive services outlined below.  Your care team is available to assist  you in scheduling these services.  If you have already completed any of these items, please share that information with your care team to update in your medical record.  Health Maintenance   Topic Date Due   ? HEPATITIS C SCREENING  1955   ? ZOSTER VACCINES (2 of 3) 02/23/2015   ? DXA SCAN  11/10/2018   ? TD 18+ HE  07/02/2019   ? MEDICARE ANNUAL WELLNESS VISIT  11/14/2019   ? PNEUMOCOCCAL IMMUNIZATION 65+ LOW/MEDIUM RISK (2 of 2 - PPSV23) 11/14/2019   ? MAMMOGRAM  11/14/2020   ? FALL RISK ASSESSMENT  11/15/2020   ? LIPID  11/14/2023   ? ADVANCE CARE PLANNING  11/14/2023   ? COLONOSCOPY  10/24/2028   ? INFLUENZA VACCINE RULE BASED  Completed           Ask Insurance if they cover SHINGRIX shingles vaccine; you need 2, 2-6 months apart

## 2021-06-17 NOTE — PROGRESS NOTES
Preoperative Exam    Scheduled Procedure: Melanoma excision with sentinel lymph node biopsy  Surgery Date:  5/9/2018  Surgery Location: [facility]  [phone]  Surgeon:  Dr. Medrano    Assessment/Plan:     1. Encounter for preoperative examination for general surgical procedure  - Electrocardiogram Perform and Read  - Hemoglobin    2. Malignant Melanoma Of The Skin      Surgical Procedure Risk: Low (reported cardiac risk generally < 1%)  Have you had prior anesthesia?: No  Have you or any family members had a previous anesthesia reaction:  Yes: nausea  Do you or any family members have a history of a clotting or bleeding disorder?: No  Cardiac Risk Assessment: no increased risk for major cardiac complications    Patient approved for surgery with general or local anesthesia.    Please Note:  none    Functional Status: Independent  Patient plans to recover at home with family.     Subjective:      Ynes Mart is a 62 y.o. female who presents for a preoperative consultation.  Patient is scheduled for a melanoma excision of the left arm with sentinel lymph node biopsy.  Patient found a concerning, new mole on her arm in January.  Biopsy revealed melanoma.  History of melanoma of the right arm.      History of SVT; controlled with Metoprolol.  On Simvastatin for hyperlipidemia.     Patient is feeling well with no complaints/concerns today.       All other systems reviewed and are negative, other than those listed in the HPI.    Pertinent History  Do you have difficulty breathing or chest pain after walking up a flight of stairs: No  History of obstructive sleep apnea: No  Steroid use in the last 6 months: No  Frequent Aspirin/NSAID use: Yes: Baby aspirin 81 mg  Prior Blood Transfusion: No  Prior Blood Transfusion Reaction: No  If for some reason prior to, during or after the procedure, if it is medically indicated, would you be willing to have a blood transfusion?:  There is no transfusion  refusal.    Current Outpatient Prescriptions   Medication Sig Dispense Refill     ascorbic acid (VITAMIN C) 1000 MG tablet Take 1,000 mg by mouth daily.       aspirin 81 MG EC tablet Take 81 mg by mouth daily.       calcium carbonate-vitamin D2 500 mg(1,250mg) -200 unit tablet Take 1 tablet by mouth 2 (two) times a day.       fluticasone (FLONASE) 50 mcg/actuation nasal spray 2 sprays into each nostril daily.       Lactobacillus rhamnosus GG (CULTURELLE) 10-15 Billion cell capsule Take 1 capsule by mouth daily.       magnesium 30 mg tablet Take 30 mg by mouth 2 (two) times a day.       metoprolol succinate (TOPROL XL) 25 MG TAKE 1 TABLET TWICE A  tablet 3     multivitamin therapeutic (THERAGRAN) tablet Take 1 tablet by mouth daily.       OMEGA-3/DHA/EPA/FISH OIL (FISH OIL-OMEGA-3 FATTY ACIDS) 300-1,000 mg capsule Take 2 g by mouth daily.       omeprazole 20 mg TbEC Take by mouth Daily before breakfast.       simvastatin (ZOCOR) 20 MG tablet TAKE 1 TABLET AT BEDTIME 90 tablet 3     No current facility-administered medications for this visit.         No Known Allergies    Patient Active Problem List   Diagnosis     Allergic Rhinitis     Basal Cell Carcinoma Of The Skin     Malignant Melanoma Of The Skin     Restless Legs Syndrome     Esophageal Reflux     Hyperlipidemia     Supraventricular tachycardia     Palpitations     Malignant melanoma of upper extremity, including shoulder       Past Medical History:   Diagnosis Date     Hyperlipidemia      SVT (supraventricular tachycardia)        Past Surgical History:   Procedure Laterality Date     HYSTERECTOMY  1995     OOPHORECTOMY  1995     MN REMOVAL OF OVARY(S)      Description: Oophorectomy;  Recorded: 09/09/2013;  Comments: cyst on ovary and endometriosis     MN TOTAL ABDOM HYSTERECTOMY      Description: Total Abdominal Hysterectomy;  Proc Date: 01/01/1999;  Comments: Dr[facility] Smith at [facility], Indication was fibroids and endometriosis  "      Social History     Social History     Marital status:      Spouse name: N/A     Number of children: 2     Years of education: N/A     Occupational History     retired      Social History Main Topics     Smoking status: Never Smoker     Smokeless tobacco: Never Used     Alcohol use 1.2 oz/week     2 Glasses of wine per week      Comment: occasional     Drug use: No     Sexual activity: Yes     Birth control/ protection: Post-menopausal, Surgical     Other Topics Concern     Not on file     Social History Narrative    Christian     retired [employer]       Patient Care Team:  Blanca Mace MD as PCP - General      Objective:     Vitals:    05/08/18 0750   BP: 124/82   Pulse: 72   Resp: 12   Temp: 98.4  F (36.9  C)   SpO2: 99%   Weight: 155 lb 8 oz (70.5 kg)   Height: 5' 5.5\" (1.664 m)         Physical Exam:  General Appearance: Alert, cooperative, no distress, appears stated age  Head: Normocephalic, without obvious abnormality, atraumatic  Eyes: PERRL, conjunctiva/corneas clear, EOM's intact  Ears: Normal TM's and external ear canals, both ears  Throat: Lips, mucosa, and tongue normal; teeth and gums normal. Normal pharynx  Neck: Supple, symmetrical, trachea midline, no adenopathy;  thyroid: not enlarged, symmetric, no tenderness/mass/nodules; no carotid bruit or JVD  Lungs: Clear to auscultation bilaterally, respirations unlabored  Heart: Regular rate and rhythm, S1 and S2 normal, no murmur, rub, or gallop   Abdomen: Soft, non-tender, bowel sounds active all four quadrants,  no masses, no organomegaly  Extremities: Extremities normal, atraumatic, no cyanosis or edema  Skin: Skin color, texture, turgor normal, no rashes.  Lymph nodes: Cervical, supraclavicular, and axillary nodes normal  Neurologic: Normal  Psychologic: appropriate affective, answers all of my questions appropriately. No hallucinations, delusion, or suicidal ideations.    Patient Instructions     Hold all supplements, aspirin and " NSAIDs for 7 days prior to surgery.  Follow your surgeon's direction on when to stop eating and drinking prior to surgery.  Your surgeon will be managing your pain after your surgery.    Remove all jewelry and metal piercings before your surgery.   Remove nail polish from fingers before surgery.      EKG:  Recent Results (from the past 24 hour(s))   Electrocardiogram Perform and Read    Collection Time: 05/08/18  8:14 AM   Result Value Ref Range    SYSTOLIC BLOOD PRESSURE  mmHg    DIASTOLIC BLOOD PRESSURE  mmHg    VENTRICULAR RATE 66 BPM    ATRIAL RATE 66 BPM    P-R INTERVAL 150 ms    QRS DURATION 86 ms    Q-T INTERVAL 400 ms    QTC CALCULATION (BEZET) 419 ms    P Axis 63 degrees    R AXIS 49 degrees    T AXIS 70 degrees    MUSE DIAGNOSIS       Normal sinus rhythm  Normal ECG  When compared with ECG of 05-DEC-2011 13:44,  No significant change was found     Hemoglobin    Collection Time: 05/08/18  8:19 AM   Result Value Ref Range    Hemoglobin 15.0 12.0 - 16.0 g/dL     Labs:  Hemoglobin    Collection Time: 05/08/18  8:19 AM   Result Value Ref Range    Hemoglobin 15.0 12.0 - 16.0 g/dL         Immunization History   Administered Date(s) Administered     Hep A, historic 07/02/2009, 01/05/2010     Influenza [id number], 01/05/2010     Influenza, inj, historic,unspecified 01/01/2000, 09/26/2016     Influenza, seasonal,quad inj 36+ mos 09/14/2015     Influenza,seasonal quad, PF, 36+MOS 09/21/2017     Influenza,seasonal, Inj IIV3 10/27/2003, 11/14/2005, 10/23/2006, 10/30/2008, 09/22/2009, 09/21/2011, 10/11/2012, 10/08/2013, 09/22/2014     Td,adult,historic,unspecified 07/02/2009     Tdap 06/13/2006, 07/02/2009     ZOSTER, LIVE 12/29/2014           Electronically signed by Sil Alegria NP 05/08/18 7:46 AM

## 2021-06-18 NOTE — PATIENT INSTRUCTIONS - HE
Patient Instructions by Corinne Gutierrez CNP at 9/17/2020  1:10 PM     Author: Corinne Gutierrez CNP Service: -- Author Type: Nurse Practitioner    Filed: 9/17/2020  1:33 PM Encounter Date: 9/17/2020 Status: Addendum    : Corinne Gutierrez CNP (Nurse Practitioner)    Related Notes: Original Note by Corinne Gutierrez CNP (Nurse Practitioner) filed at 9/17/2020  1:32 PM       Rest, push fluids.    Zofran as needed for nausea.    Your labs are processing at this time, I will release them via my chart once they are back, and we can discuss your symptoms further.    ER if symptoms worsen overnight.      Patient Education     Diet for Vomiting or Diarrhea (Adult)    Your symptoms may return or get worse after eating certain foods listed below. If this happens, stop eating these foods until your symptoms ease and you feel better.  Once the vomiting stops, follow the steps below.   During the first 12 to 24 hours  During the first 12 to 24 hours, follow this diet:    Drinks. Plain water, sport drinks like electrolyte solutions, soft drinks without caffeine, mineral water (plain or flavored), clear fruit juices, and decaffeinated tea and coffee.    Soups. Clear broth.    Desserts. Plain gelatin, popsicles, and fruit juice bars. As you feel better, you may add 6 to 8 ounces of yogurt per day. If you have diarrhea, don't have foods or drinks that contain sugar, high-fructose corn syrup, or sugar alcohols.  During the next 24 hours  During the next 24 hours you may add the following to the above:    Hot cereal, plain toast, bread, rolls, and crackers    Plain noodles, rice, mashed potatoes, and chicken noodle or rice soup    Unsweetened canned fruit (but not pineapple) and bananas  Don't eat more than 15 grams of fat a day. Do this by staying away from margarine, butter, oils, mayonnaise, sauces, gravies, fried foods, peanut butter, meat, poultry, and fish.  Don't eat much fiber. Stay away  from raw or cooked vegetables, fresh fruits (except bananas), and bran cereals.  Limit how much caffeine and chocolate you have. Do not use any spices or seasonings except salt.  During the next 24 hours  Slowly go back to your normal diet, as you feel better and your symptoms ease.  Date Last Reviewed: 8/1/2016 2000-2017 [employer]. [address] [place] [address], [place] [address]. All rights reserved. This information is not intended as a substitute for professional medical care. Always follow your healthcare professional's instructions.

## 2021-06-21 NOTE — PROGRESS NOTES
Assessment and Plan:   Welcome to Medicare    1. Encounter for general adult medical examination without abnormal findings   She is deferring on the DEXA; she had excellent microarchitechture 3 years ago and is deciding to wait until next year    - Pneumococcal polysaccharide vaccine 23-valent greater than or equal to 1yo subcutaneous/IM; Future  - Pneumococcal conjugate vaccine 13-valent 6wks-17yrs; >50yrs    2. Hyperlipidemia, unspecified hyperlipidemia type     - simvastatin (ZOCOR) 20 MG tablet; TAKE 1 TABLET AT BEDTIME.  Dispense: 90 tablet; Refill: 3  - Comprehensive Metabolic Panel    3. Gastroesophageal reflux disease without esophagitis  Tried to use OTC meds and they don't work as well as Omeprazole. Discussed risks and benefits. She will resume it and monitor for side effects    4. Malignant Melanoma Of The Skin  May 2018; seeing surgeon and Dermatologist    5. Basal Cell Carcinoma Of The Skin       6. Palpitations     - metoprolol succinate (TOPROL XL) 25 MG; TAKE 1 TABLET TWICE A DAY.  Dispense: 180 tablet; Refill: 3    7. Malignant melanoma of left upper extremity including shoulder (H)       8. Supraventricular tachycardia (H)     - metoprolol succinate (TOPROL XL) 25 MG; TAKE 1 TABLET TWICE A DAY.  Dispense: 180 tablet; Refill: 3  - Comprehensive Metabolic Panel    9. Encounter for screening for lipoid disorders     - Lipid [place]- FASTING  - Comprehensive Metabolic Panel        The patient's current medical problems were reviewed.    I have had an Advance Directives discussion with the patient.  The following health maintenance schedule was reviewed with the patient and provided in printed form in the after visit summary:   Health Maintenance   Topic Date Due     ZOSTER VACCINES (2 of 3) 02/23/2015     DXA SCAN  11/10/2018     PNEUMOCOCCAL POLYSACCHARIDE VACCINE AGE 65 AND OVER  11/10/2018     PNEUMOCOCCAL CONJUGATE VACCINE FOR ADULTS (PCV13 OR PREVNAR)  11/10/2018     FALL RISK ASSESSMENT   11/10/2018     TD 18+ HE  07/02/2019     MAMMOGRAM  11/08/2019     ADVANCE DIRECTIVES DISCUSSED WITH PATIENT  11/10/2021     COLONOSCOPY  10/24/2028     INFLUENZA VACCINE RULE BASED  Completed     TDAP ADULT ONE TIME DOSE  Completed        Subjective:   Chief Complaint: Ynes Mart is an 63 y.o. female here for a Welcome to Medicare visit.   HPI: She is here for her welcome to Medicare visit.  She does not really have any physical concerns.  She did have a malignant melanoma removed from her left upper arm in May so she is been following with her dermatologic surgeon and she is seeing the dermatologist every 3 months.    She tried going off the omeprazole for her heartburn and now is having daily heartburn again since nothing over-the-counter besides the omeprazole works well for her.  We discussed the risks and benefits of a continued proton pump inhibitor use.  We will be monitoring renal function every year as a matter of course.  I suggested that she switch from calcium carbonate to calcium citrate as it is better absorbed in a lower acid environment of the stomach.    I was unable to order a vitamin D level today but I have suggested that she take 2000 international units daily.  She had a bone density completed 3 years ago and had excellent microarchitecture so she opted to wait until next year to do another bone density.  We talked about out adequate calcium and vitamin D intake.  I have suggested calcium citrate because it will be better absorbed when she is taking the omeprazole every day.    Review of Systems:     Please see above.  The rest of the review of systems are negative for all systems.    Patient Care Team:  Blanca Mace MD as PCP - General     Patient Active Problem List   Diagnosis     Allergic Rhinitis     Basal Cell Carcinoma Of The Skin     Malignant Melanoma Of The Skin     Restless Legs Syndrome     Esophageal Reflux     Hyperlipidemia     Supraventricular tachycardia (H)      Palpitations     Malignant melanoma of upper extremity, including shoulder (H)     Past Medical History:   Diagnosis Date     Hyperlipidemia      Melanoma (H) 05/2018    Left Arm     SVT (supraventricular tachycardia) (H)       Past Surgical History:   Procedure Laterality Date     HYSTERECTOMY  1995     OOPHORECTOMY  1995     MS REMOVAL OF OVARY(S)      Description: Oophorectomy;  Recorded: 09/09/2013;  Comments: cyst on ovary and endometriosis     MS TOTAL ABDOM HYSTERECTOMY      Description: Total Abdominal Hysterectomy;  Proc Date: 01/01/1999;  Comments: Dr. Smith at [facility], Indication was fibroids and endometriosis      Family History   Problem Relation Age of Onset     Heart disease Mother         MI @ 94     Hyperlipidemia Mother      Hypertension Mother      COPD Father      Heart disease Brother 66        massive MI     Diabetes Brother         type 1     Diabetes Sister      Kidney disease Sister      Heart disease Sister      Ovarian cancer Maternal Aunt 80      Social History     Socioeconomic History     Marital status:      Spouse name: Not on file     Number of children: 2     Years of education: Not on file     Highest education level: Not on file   Social Needs     Financial resource strain: Not on file     Food insecurity - worry: Not on file     Food insecurity - inability: Not on file     Transportation needs - medical: Not on file     Transportation needs - non-medical: Not on file   Occupational History     Occupation: retired   Tobacco Use     Smoking status: Never Smoker     Smokeless tobacco: Never Used   Substance and Sexual Activity     Alcohol use: Yes     Alcohol/week: 1.2 oz     Types: 2 Glasses of wine per week     Comment: occasional     Drug use: No     Sexual activity: Yes     Birth control/protection: Post-menopausal, Surgical   Other Topics Concern     Not on file   Social History Narrative    Catholic     retired [employer]       Current Outpatient  "Medications   Medication Sig Dispense Refill     ascorbic acid (VITAMIN C) 1000 MG tablet Take 1,000 mg by mouth daily.       aspirin 81 MG EC tablet Take 81 mg by mouth daily.       calcium carbonate-vitamin D2 500 mg(1,250mg) -200 unit tablet Take 1 tablet by mouth 2 (two) times a day.       fluorouracil (EFUDEX) 5 % cream        fluticasone (FLONASE) 50 mcg/actuation nasal spray 2 sprays into each nostril daily.       Lactobacillus rhamnosus GG (CULTURELLE) 10-15 Billion cell capsule Take 1 capsule by mouth daily.       magnesium 30 mg tablet Take 30 mg by mouth 2 (two) times a day.       metoprolol succinate (TOPROL XL) 25 MG TAKE 1 TABLET TWICE A DAY. 180 tablet 3     multivitamin therapeutic (THERAGRAN) tablet Take 1 tablet by mouth daily.       OMEGA-3/DHA/EPA/FISH OIL (FISH OIL-OMEGA-3 FATTY ACIDS) 300-1,000 mg capsule Take 2 g by mouth daily.       simvastatin (ZOCOR) 20 MG tablet TAKE 1 TABLET AT BEDTIME. 90 tablet 3     omeprazole 20 mg TbEC Take by mouth Daily before breakfast.       No current facility-administered medications for this visit.       Objective:   Vital Signs:   Visit Vitals  /78 (Patient Site: Right Arm, Patient Position: Sitting, Cuff Size: Adult Regular)   Pulse 85   Ht 5' 4.75\" (1.645 m)   Wt 152 lb 8 oz (69.2 kg)   SpO2 99%   BMI 25.57 kg/m         EKG:       VisionScreening:  No exam data present     PHYSICAL EXAM  General Appearance: Alert, cooperative, no distress, appears stated age  Head: Normocephalic, without obvious abnormality, atraumatic  Eyes: PERRL, conjunctiva/corneas clear, EOM's intact  Ears: Normal TM's and external ear canals, both ears  Nose: Nares normal, septum midline,mucosa normal, no drainage  Throat: Lips, mucosa, and tongue normal; teeth and gums normal  Neck: Supple, symmetrical, trachea midline, no adenopathy;  thyroid: not enlarged, symmetric, no tenderness/mass/nodules   Back: Symmetric, no curvature, ROM normal, no CVA tenderness  Lungs: Clear to " auscultation bilaterally, respirations unlabored  Breasts: No breast masses, tenderness, asymmetry, or nipple discharge.  Heart: Regular rate and rhythm, S1 and S2 normal, no murmur, rub, or gallop, Abdomen: Soft, non-tender, bowel sounds active all four quadrants,  no masses, no organomegaly  Pelvic:Not examined  Extremities: Extremities normal, atraumatic, no cyanosis or edema  Skin: Skin color, texture, turgor normal, no rashes or lesions-sees Dermatologist Q 3 months  Lymph nodes: Cervical, supraclavicular, and axillary nodes normal  Neurologic: Normal     Assessment Results 11/14/2018   Activities of Daily Living No help needed   Instrumental Activities of Daily Living No help needed   Mini Cog Total Score 5   Some recent data might be hidden     A Mini Cog score of 0-2 suggests the possibility of dementia, score of 3-5 suggests no dementia    Identified Health Risks:     Information regarding advance directives (living [place]), including where she can download the appropriate form, was provided to the patient via the AVS.

## 2021-06-23 NOTE — TELEPHONE ENCOUNTER
RN triage   Call from pt   Pt states yesterday she was babysitting grandson and today grandson dx w/ infuenza -- and grandson's family all being treated w/ tamiflu and told to have contacts call their PCP   Pt does not have any symptoms -- no fever - no body aches - no respiratory symptoms   Per protocol = should check w/ PCP   Pharmacy = [facility] [place]   Please advise  Jenny Norton RN [facility] RN triage          Reason for Disposition    Influenza EXPOSURE within last 72 hours (3 days) and exposed person is HIGH RISK (e.g., age > 64 years, pregnant, HIV+, chronic medical condition)    Protocols used: INFLUENZA EXPOSURE-A-[place]

## 2021-06-23 NOTE — TELEPHONE ENCOUNTER
I would prefer to not treat unless she has symptoms; she is not living there, so the exposure was less significant

## 2021-06-23 NOTE — TELEPHONE ENCOUNTER
Per call to pt, their exposure was that they had their grandson for about 6 hours last night. No symptoms currently.

## 2021-06-26 ENCOUNTER — HEALTH MAINTENANCE LETTER (OUTPATIENT)
Age: 66
End: 2021-06-26

## 2021-06-29 NOTE — PROGRESS NOTES
Progress Notes by Corinne Gutierrez CNP at 9/17/2020  1:10 PM     Author: Corinne Gutierrez CNP Service: -- Author Type: Nurse Practitioner    Filed: 9/19/2020  4:09 PM Encounter Date: 9/17/2020 Status: Signed    : Corinne Gutierrez CNP (Nurse Practitioner)       Clinic Note    Assessment:     Assessment and Plan:  1. Acute chest pain: Ekg read by provider showed a normal sinus rhythm. Suspected musculoskeletal etiology.   - Electrocardiogram Perform and Read    2. Muscle fatigue: Sore leg muscles, legs feel very tired. [place]. Will check a Lyme's cascade and autoimmune panel. She is on a statin, will check a CK for muscle break down. All labs were normal.  - C-Reactive Protein(CRP)  - CK Total  - Lyme Antibody Cascade  - Sedimentation Rate  - Antinuclear Antibodies Screen (MONET)    3. Fatigue, unspecified type: Increased fatigue. Will check a TSH and CBC, both were normal.   - HM1(CBC and Differential)  - Thyroid Stimulating Hormone (TSH)    4. Nausea/Intractable vomiting with nausea, unspecified vomiting type: Normal CMP. Vomiting has gone away. She continues to feel nauseous. Will trial Zofran and BRAT diet.  - ondansetron (ZOFRAN) 4 MG tablet; Take 1 tablet (4 mg total) by mouth every 8 (eight) hours as needed for nausea.  Dispense: 10 tablet; Refill: 1  - Comprehensive Metabolic Panel  -BRAT diet.  -Rest, Push fluids.    5. Asymptomatic postmenopausal status: Vitamin D level checked today, was normal.     Suspected false negative COVID test. Encouraged the patient to get retested if symptoms do not improve. Encouraged her to self isolate at home. Follow up if symptoms do not improve.        Patient Instructions   Rest, push fluids.    Zofran as needed for nausea.    Your labs are processing at this time, I will release them via my chart once they are back, and we can discuss your symptoms further.    ER if symptoms worsen overnight.      Patient Education     Diet  for Vomiting or Diarrhea (Adult)    Your symptoms may return or get worse after eating certain foods listed below. If this happens, stop eating these foods until your symptoms ease and you feel better.  Once the vomiting stops, follow the steps below.   During the first 12 to 24 hours  During the first 12 to 24 hours, follow this diet:    Drinks. Plain water, sport drinks like electrolyte solutions, soft drinks without caffeine, mineral water (plain or flavored), clear fruit juices, and decaffeinated tea and coffee.    Soups. Clear broth.    Desserts. Plain gelatin, popsicles, and fruit juice bars. As you feel better, you may add 6 to 8 ounces of yogurt per day. If you have diarrhea, don't have foods or drinks that contain sugar, high-fructose corn syrup, or sugar alcohols.  During the next 24 hours  During the next 24 hours you may add the following to the above:    Hot cereal, plain toast, bread, rolls, and crackers    Plain noodles, rice, mashed potatoes, and chicken noodle or rice soup    Unsweetened canned fruit (but not pineapple) and bananas  Don't eat more than 15 grams of fat a day. Do this by staying away from margarine, butter, oils, mayonnaise, sauces, gravies, fried foods, peanut butter, meat, poultry, and fish.  Don't eat much fiber. Stay away from raw or cooked vegetables, fresh fruits (except bananas), and bran cereals.  Limit how much caffeine and chocolate you have. Do not use any spices or seasonings except salt.  During the next 24 hours  Slowly go back to your normal diet, as you feel better and your symptoms ease.  Date Last Reviewed: 8/1/2016 2000-2017 [employer]. [address] [place] [address], [place] [address]. All rights reserved. This information is not intended as a substitute for professional medical care. Always follow your healthcare professional's instructions.             Return if symptoms worsen or fail to improve.         Subjective:      Ynes Mart is a 64 y.o.  female presents today with concerns of weakness in her legs.    She reports that her symptoms started on Saturday, 09/12/20 while she was up at her cabin near [place]. She reports her muscles in her legs felt weak. She also felt nauseous, had some chest pain and tightness, had diarrhea for two days (this has resolved), and now feels very tired and weak.    She denies any shortness of breath, cough, loss of smell or taste.    She was COVID-19 tested at the Urgent Care in [place], and this was negative.    She denies a fever or chills, or any body aches.    She has not been taking anything to help with her symptoms at home.    She is able to eat and drink normally.    The following portions of the patient's history were reviewed and updated as appropriate.    Review of Systems:    Review is otherwise negative except for what is mentioned above.     Social Hx:    Social History     Tobacco Use   Smoking Status Never Smoker   Smokeless Tobacco Never Used         Objective:     Vitals:    09/17/20 1312   BP: 132/84   Pulse: 60   Weight: 151 lb 11.2 oz (68.8 kg)       Exam:    General: No apparent distress. Calm. Alert and Oriented X3. Pt behavior is appropriate.  Head:Atraumatic. Normocephalic, non-tender to palpation.  Neck: Supple. No JVD. Full ROM. No adenopathy.  Eyes: PERRLA, No discharge. No strabismus. No nystagmus.  Ears: TMs pearly gray with landmarks visible.   Nose/Mouth/Throat: Patent nares, no oral lesions, pharynx clear and without exudate. Uvula mid-line. Nasal septum mid-line. Clear turbinates.   Lungs: Clear to auscultation, normal respiratory effort and rate.   Heart: Regular rate and rhythm, strong and equal radial pulses, no murmurs, gallops, or rubs. Capillary refill <2 seconds. No edema.   Abdomen: Soft, no palpable masses. No hepatosplenomegaly, no tenderness with palpation noted. Bowel sounds active in all quadrants. No increased tympany.   Genitalia: Not examined.   Musculoskeletal: No CVA  tenderness with palpation. Good ROM with extremities.   Neurologic: Interactive, alert, no focal findings.  Skin: Warm, dry. Normal skin turgor.       Patient Active Problem List   Diagnosis   ? Allergic Rhinitis   ? Basal Cell Carcinoma Of The Skin   ? Restless Legs Syndrome   ? Esophageal Reflux   ? Hyperlipidemia   ? Supraventricular tachycardia (H)   ? Palpitations   ? Malignant melanoma of upper extremity, including shoulder (H)     Current Outpatient Medications   Medication Sig Dispense Refill   ? ascorbic acid (VITAMIN C) 1000 MG tablet Take 1,000 mg by mouth daily.     ? aspirin 81 MG EC tablet Take 81 mg by mouth daily.     ? calcium carbonate-vitamin D2 500 mg(1,250mg) -200 unit tablet Take 1 tablet by mouth 2 (two) times a day.     ? fluorouracil (EFUDEX) 5 % cream      ? fluticasone (FLONASE) 50 mcg/actuation nasal spray 2 sprays into each nostril daily.     ? Lactobacillus rhamnosus GG (CULTURELLE) 10-15 Billion cell capsule Take 1 capsule by mouth daily.     ? magnesium 30 mg tablet Take 30 mg by mouth 2 (two) times a day.     ? metoprolol succinate (TOPROL XL) 25 MG TAKE 1 TABLET TWICE A  tablet 3   ? multivitamin therapeutic (THERAGRAN) tablet Take 1 tablet by mouth daily.     ? OMEGA-3/DHA/EPA/FISH OIL (FISH OIL-OMEGA-3 FATTY ACIDS) 300-1,000 mg capsule Take 2 g by mouth daily.     ? omeprazole 20 mg TbEC Take by mouth Daily before breakfast.     ? simvastatin (ZOCOR) 20 MG tablet Take 1 tablet (20 mg total) by mouth at bedtime. 90 tablet 3   ? ondansetron (ZOFRAN) 4 MG tablet Take 1 tablet (4 mg total) by mouth every 8 (eight) hours as needed for nausea. 10 tablet 1     No current facility-administered medications for this visit.      Corinne Gutierrez, Adult-Geriatric Nurse Practitioner  [facility] - Internal Medicine Team     9/17/2020

## 2021-07-13 ENCOUNTER — RECORDS - HEALTHEAST (OUTPATIENT)
Dept: ADMINISTRATIVE | Facility: CLINIC | Age: 66
End: 2021-07-13

## 2021-07-21 ENCOUNTER — RECORDS - HEALTHEAST (OUTPATIENT)
Dept: ADMINISTRATIVE | Facility: CLINIC | Age: 66
End: 2021-07-21

## 2021-07-22 ENCOUNTER — RECORDS - HEALTHEAST (OUTPATIENT)
Dept: FAMILY MEDICINE | Facility: CLINIC | Age: 66
End: 2021-07-22

## 2021-07-22 DIAGNOSIS — Z12.31 OTHER SCREENING MAMMOGRAM: ICD-10-CM

## 2021-08-06 NOTE — PATIENT INSTRUCTIONS - HE
Patient Instructions by Blanca Mace MD at 11/18/2020  1:20 PM     Author: Blanca Mace MD Service: -- Author Type: Physician    Filed: 11/18/2020  1:35 PM Encounter Date: 11/18/2020 Status: Signed    : Blanca Mace MD (Physician)         Patient Education   Understanding USDA MyPlate  The USDA (US Department of Agriculture) has guidelines to help you make healthy food choices. These are called MyPlate. MyPlate shows the food groups that make up healthy meals using the image of a place setting. Before you eat, think about the healthiest choices for what to put onto your plate or into your cup or bowl. To learn more about building a healthy plate, visit www.ZMPplate.gov.       The Food Groups    Fruits: Any fruit or 100% fruit juice counts as part of the Fruit Group. Fruits may be fresh, canned, frozen, or dried, and may be whole, cut-up, or pureed. Make half your plate fruits and vegetables.    Vegetables: Any vegetable or 100% vegetable juice counts as a member of the Vegetable Group. Vegetables may be fresh, frozen, canned, or dried. They can be served raw or cooked and may be whole, cut-up, or mashed. Make half your plate fruits and vegetables.     Grains: All foods made from grains are part of the Grains Group. These include wheat, rice, oats, cornmeal, and barley such as bread, pasta, oatmeal, cereal, tortillas, and grits. Grains should be no more than a quarter of your plate. At least half of your grains should be whole grains.    Protein: This group includes meat, poultry, seafood, beans and peas, eggs, processed soy products (like tofu), nuts (including nut butters), and seeds. Make protein choices no more than a quarter of your plate. Meat and poultry choices should be lean or low fat.    Dairy: All fluid milk products and foods made from milk that contain calcium, like yogurt and cheese are part of the Dairy Group. (Foods that have little calcium, such as cream, butter, and cream  cheese, are not part of the group.) Most dairy choices should be low-fat or fat-free.    Oils: These are fats that are liquid at room temperature. They include canola, corn, olive, soybean, and sunflower oil. Foods that are mainly oil include mayonnaise, certain salad dressings, and soft margarines. You should have only 5 to 7 teaspoons of oils a day. You probably already get this much from the food you eat.  Use [employer] to Help Build Your Meals  The [employer] can help you plan and track your meals and activity. You can look up individual foods to see or compare their nutritional value. You can get guidelines for what and how much you should eat. You can compare your food choices. And you can assess personal physical activities and see ways you can improve. Go to www.[employer].gov/Haha Pinchecker/.    4230-9596 [employer]. [address] [place] [address]. All rights reserved. This information is not intended as a substitute for professional medical care. Always follow your healthcare professional's instructions.           Patient Education   Urinary Incontinence, Female (Adult)  Urinary incontinence means loss of control of the bladder. This problem affects many women, especially as they get older. If you have incontinence, you may be embarrassed to ask for help. But know that this problem can be treated.  Types of Incontinence  There are different types of incontinence. Two of the main types are described here. You can have more than one type.    Stress incontinence. With this type, urine leaks when pressure (stress) is put on the bladder. This may happen when you cough, sneeze, or laugh. Stress incontinence most often occurs because the pelvic floor muscles that support the bladder and urethra are weak. This can happen after pregnancy and vaginal childbirth or a hysterectomy. It can also be due to excess body weight or hormone changes.    Urge incontinence (also called overactive  bladder). With this type, a sudden urge to urinate is felt often. This may happen even though there may not be much urine in the bladder. The need to urinate often during the night is common. Urge incontinence most often occurs because of bladder spasms. This may be due to bladder irritation or infection. Damage to bladder nerves or pelvic muscles, constipation, and certain medicines can also lead to urge incontinence.  Treatment of urinary incontinence depends on the cause. Further evaluation is needed to find the type you have. This will likely include an exam and certain tests. Based on the results, you and your healthcare provider can then plan treatment. Until a diagnosis is made, the home care tips below can help relieve symptoms.  Home care    Do pelvic floor muscle exercises, if they are prescribed. The pelvic floor muscles help support the bladder and urethra. Many women find that their symptoms improve when doing special exercises that strengthen these muscles. To do the exercises contract the muscles you would use to stop your stream of urine, but do this when youre not urinating. Hold for 10 seconds, then relax. Repeat 10 to 20 times in a row, at least 3 times a day. Your provider may give you other instructions for how to do the exercises and how often.    Keep a bladder diary. This helps track how often and how much you urinate over a set period of time. Bring this diary with you to your next visit with the provider. The information can help your provider learn more about your bladder problem.    Lose weight, if advised to by your provider. Excess weight puts pressure on the bladder. Your provider can help you create a weight-loss plan thats right for you. This may include exercising more and making certain diet changes.    Don't consume foods and drinks that may irritate the bladder. These can include alcohol and caffeinated drinks.    Quit smoking. Smoking and other tobacco use can lead to chronic  cough that strains the pelvic floor muscles. Smoking may also damage the bladder and urethra. Talk with your provider about treatments or methods you can use to quit smoking.    If drinking large amounts of fluid causes you to have symptoms, you may be advised to limit your fluid intake. You may also be advised to drink most of your fluids during the day and to limit fluids at night.    If youre worried about urine leakage or accidents, you may wear absorbent pads to catch urine. Change the pads often. This helps reduce discomfort. It may also reduce the risk of skin or bladder infections.  Follow-up care  Follow up with your healthcare provider, or as directed. It may take some to find the right treatment for your problem. Your treatment plan may include special therapies or medicines. Certain procedures or surgery may also be options. Be sure to discuss any questions you have with your provider.  When to seek medical advice  Call the healthcare provider right away if any of these occur:    Fever of 100.4 F (38 C) or higher, or as directed by your provider    Bladder pain or fullness    Abdominal swelling    Nausea or vomiting    Back pain    Weakness, dizziness or fainting  Date Last Reviewed: 10/1/2017    5855-3971 [employer] [address] [place] [address]. All rights reserved. This information is not intended as a substitute for professional medical care. Always follow your healthcare professional's instructions.     Patient Education   Your Health Risk Assessment indicates you feel you are not in good emotional health.    Recreation   Recreation is not limited to sports and team events. It includes any activity that provides relaxation, interest, enjoyment, and exercise. Recreation provides an outlet for physical, mental, and social energy. It can give a sense of worth and achievement. It can help you stay healthy.    Mental Exercise and Social Involvement  Mental and emotional health  is as important as physical health. Keep in touch with friends and family. Stay as active as possible. Continue to learn and challenge yourself.   Things you can do to stay mentally active are:    Learn something new, like a foreign language or musical instrument.     Play SCRABBLE or do crossword puzzles. If you cannot find people to play these games with you at home, you can play them with others on your computer through the Internet.     Join a games club--anything from card games to chess or checkers or lawn bowling.     Start a new hobby.     Go back to school.     Volunteer.     Read.     Keep up with world events.       Patient Education   Depression and Suicide in Older Adults  Nearly 2 million older Americans have some type of depression. Sadly, some of them even take their own lives. Yet depression among older adults is often ignored. Learn the warning signs. You may help spare a loved one needless pain. You may also save a life.       What Is Depression?  Depression is a mood disorder that affects the way you think and feel. The most common symptom is a feeling of deep sadness. People who are depressed also may seem tired and listless. And nothing seems to give them pleasure. Its normal to grieve or be sad sometimes. But sadness lessens or passes with time. Depression rarely goes away or improves on its own. Other symptoms of depression are:    Sleeping more or less than normal    Eating more or less than normal    Having headaches, stomachaches, or other pains that dont go away    Feeling nervous, empty, or worthless    Crying a great deal    Thinking or talking about suicide or death    Feeling confused or forgetful  What Causes It?  The causes of depression arent fully known. Certain chemicals in the brain play a role. Depression does run in families. And life stresses can also trigger depression in some people. That may be the case with older adults. They often face great burdens, such as the death of  friends or a spouse. They may have failing health. And they are more likely to be alone, lonely, or poor.  How You Can Help  Often, depressed people may not want to ask for help. When they do, they may be ignored. Or, they may receive the wrong treatment. You can help by showing parents and older friends love and support. If they seem depressed, help them find the right treatment. Talk to your doctor. Or contact a local mental health center, social service agency, or hospital. With modern treatment, no one has to suffer from depression.  Resources:    National [place] of Mental Health  768.858.1411  www.nimh.nih.gov    National Nixon on Mental Illness  853.602.5032  www.etta.org    [facility]  188.928.1831  www.[facility].org    National Suicide Hotline  659.590.5689 (800-SUICIDE)      3152-8919 [employer]. [address] [place] [address]. All rights reserved. This information is not intended as a substitute for professional medical care. Always follow your healthcare professional's instructions.           Advance Directive  Patients advance directive was discussed and I am comfortable with the patients wishes.  Patient Education   Personalized Prevention Plan  You are due for the preventive services outlined below.  Your care team is available to assist you in scheduling these services.  If you have already completed any of these items, please share that information with your care team to update in your medical record.  Health Maintenance   Topic Date Due   ? DEPRESSION ACTION PLAN  1955   ? ZOSTER VACCINES (3 of 3) 01/10/2020   ? Pneumococcal Vaccine: 65+ Years (2 of 2 - PPSV23) 11/15/2020   ? MAMMOGRAM  11/15/2021   ? MEDICARE ANNUAL WELLNESS VISIT  11/18/2021   ? FALL RISK ASSESSMENT  11/18/2021   ? DXA SCAN  11/26/2021   ? LIPID  11/15/2024   ? ADVANCE CARE PLANNING  11/15/2024   ? COLORECTAL CANCER SCREENING  10/24/2028   ? TD 18+ HE  11/15/2029   ? HEPATITIS C  SCREENING  Completed   ? INFLUENZA VACCINE RULE BASED  Completed   ? Pneumococcal Vaccine: Pediatrics (0 to 5 Years) and At-Risk Patients (6 to 64 Years)  Aged Out   ? HEPATITIS B VACCINES  Aged Out

## 2021-08-30 ENCOUNTER — TRANSFERRED RECORDS (OUTPATIENT)
Dept: HEALTH INFORMATION MANAGEMENT | Facility: CLINIC | Age: 66
End: 2021-08-30

## 2021-09-22 ENCOUNTER — TRANSFERRED RECORDS (OUTPATIENT)
Dept: HEALTH INFORMATION MANAGEMENT | Facility: CLINIC | Age: 66
End: 2021-09-22

## 2021-10-11 ENCOUNTER — HEALTH MAINTENANCE LETTER (OUTPATIENT)
Age: 66
End: 2021-10-11

## 2021-11-12 ENCOUNTER — TRANSFERRED RECORDS (OUTPATIENT)
Dept: HEALTH INFORMATION MANAGEMENT | Facility: CLINIC | Age: 66
End: 2021-11-12

## 2021-11-15 DIAGNOSIS — E78.5 HYPERLIPIDEMIA, UNSPECIFIED HYPERLIPIDEMIA TYPE: ICD-10-CM

## 2021-11-15 DIAGNOSIS — I47.10 SUPRAVENTRICULAR TACHYCARDIA (H): ICD-10-CM

## 2021-11-15 DIAGNOSIS — R00.2 PALPITATIONS: ICD-10-CM

## 2021-11-16 RX ORDER — METOPROLOL SUCCINATE 25 MG/1
TABLET, EXTENDED RELEASE ORAL
Qty: 180 TABLET | Refills: 0 | Status: SHIPPED | OUTPATIENT
Start: 2021-11-16 | End: 2021-11-19

## 2021-11-16 RX ORDER — SIMVASTATIN 20 MG
TABLET ORAL
Qty: 90 TABLET | Refills: 0 | Status: SHIPPED | OUTPATIENT
Start: 2021-11-16 | End: 2021-11-19

## 2021-11-16 NOTE — TELEPHONE ENCOUNTER
"Last Written Prescription Date:  11/18/2020-metoprolol.  Last Fill Quantity: 180,  # refills: 3   Last office visit provider:   11/18/2020 with Dr aMce.    Last Written Prescription Date:  11/18/2020-simvastatin.  Last Fill Quantity: 90,  # refills: 3   Last office visit provider:   11/18/2020 with Dr Mace    Requested Prescriptions   Pending Prescriptions Disp Refills     simvastatin (ZOCOR) 20 MG tablet [Pharmacy Med Name: SIMVASTATIN TABS 20MG] 90 tablet 3     Sig: TAKE 1 TABLET AT BEDTIME       Statins Protocol Passed - 11/15/2021  1:25 AM        Passed - LDL on file in past 12 months     Recent Labs   Lab Test 11/18/20  1402   LDL 65             Passed - No abnormal creatine kinase in past 12 months     Recent Labs   Lab Test 09/17/20  1341   CKT 85                Passed - Recent (12 mo) or future (30 days) visit within the authorizing provider's specialty     Patient has had an office visit with the authorizing provider or a provider within the authorizing providers department within the previous 12 mos or has a future within next 30 days. See \"Patient Info\" tab in inbasket, or \"Choose Columns\" in Meds & Orders section of the refill encounter.              Passed - Medication is active on med list        Passed - Patient is age 18 or older        Passed - No active pregnancy on record        Passed - No positive pregnancy test in past 12 months           metoprolol succinate ER (TOPROL-XL) 25 MG 24 hr tablet [Pharmacy Med Name: METOPROLOL SUCCINATE ER TABS 25MG] 180 tablet 3     Sig: TAKE 1 TABLET TWICE A DAY       Beta-Blockers Protocol Passed - 11/15/2021  1:25 AM        Passed - Blood pressure under 140/90 in past 12 months     BP Readings from Last 3 Encounters:   11/18/20 120/70   11/09/20 110/64   10/09/20 118/64                 Passed - Patient is age 6 or older        Passed - Recent (12 mo) or future (30 days) visit within the authorizing provider's specialty     Patient has had an office visit " "with the authorizing provider or a provider within the authorizing providers department within the previous 12 mos or has a future within next 30 days. See \"Patient Info\" tab in inbasket, or \"Choose Columns\" in Meds & Orders section of the refill encounter.              Passed - Medication is active on med list             Melani Reynolds 11/16/21 1:08 PM  "

## 2021-11-19 ENCOUNTER — OFFICE VISIT (OUTPATIENT)
Dept: FAMILY MEDICINE | Facility: CLINIC | Age: 66
End: 2021-11-19
Payer: COMMERCIAL

## 2021-11-19 VITALS
DIASTOLIC BLOOD PRESSURE: 76 MMHG | SYSTOLIC BLOOD PRESSURE: 110 MMHG | OXYGEN SATURATION: 99 % | BODY MASS INDEX: 24.03 KG/M2 | WEIGHT: 144.4 LBS | HEART RATE: 71 BPM

## 2021-11-19 DIAGNOSIS — R00.2 PALPITATIONS: ICD-10-CM

## 2021-11-19 DIAGNOSIS — E78.5 HYPERLIPIDEMIA, UNSPECIFIED HYPERLIPIDEMIA TYPE: ICD-10-CM

## 2021-11-19 DIAGNOSIS — F41.9 ANXIETY: ICD-10-CM

## 2021-11-19 DIAGNOSIS — Z00.00 ROUTINE GENERAL MEDICAL EXAMINATION AT A HEALTH CARE FACILITY: Primary | ICD-10-CM

## 2021-11-19 DIAGNOSIS — F43.21 GRIEF REACTION: ICD-10-CM

## 2021-11-19 DIAGNOSIS — I47.10 SUPRAVENTRICULAR TACHYCARDIA (H): ICD-10-CM

## 2021-11-19 DIAGNOSIS — N95.8 OTHER SPECIFIED MENOPAUSAL AND PERIMENOPAUSAL DISORDERS: ICD-10-CM

## 2021-11-19 DIAGNOSIS — M85.80 OSTEOPENIA, UNSPECIFIED LOCATION: ICD-10-CM

## 2021-11-19 DIAGNOSIS — Z79.899 MEDICATION MANAGEMENT: ICD-10-CM

## 2021-11-19 DIAGNOSIS — E78.49 OTHER HYPERLIPIDEMIA: ICD-10-CM

## 2021-11-19 DIAGNOSIS — C43.60 MALIGNANT MELANOMA OF UPPER EXTREMITY, INCLUDING SHOULDER, UNSPECIFIED LATERALITY (H): ICD-10-CM

## 2021-11-19 DIAGNOSIS — Z78.0 MENOPAUSE: ICD-10-CM

## 2021-11-19 LAB
ALBUMIN SERPL-MCNC: 4.3 G/DL (ref 3.5–5)
ALP SERPL-CCNC: 70 U/L (ref 45–120)
ALT SERPL W P-5'-P-CCNC: 27 U/L (ref 0–45)
ANION GAP SERPL CALCULATED.3IONS-SCNC: 7 MMOL/L (ref 5–18)
AST SERPL W P-5'-P-CCNC: 33 U/L (ref 0–40)
BILIRUB SERPL-MCNC: 0.9 MG/DL (ref 0–1)
BUN SERPL-MCNC: 10 MG/DL (ref 8–22)
CALCIUM SERPL-MCNC: 9.8 MG/DL (ref 8.5–10.5)
CHLORIDE BLD-SCNC: 105 MMOL/L (ref 98–107)
CHOLEST SERPL-MCNC: 212 MG/DL
CO2 SERPL-SCNC: 29 MMOL/L (ref 22–31)
CREAT SERPL-MCNC: 0.72 MG/DL (ref 0.6–1.1)
FASTING STATUS PATIENT QL REPORTED: ABNORMAL
GFR SERPL CREATININE-BSD FRML MDRD: 86 ML/MIN/1.73M2
GLUCOSE BLD-MCNC: 88 MG/DL (ref 70–125)
HDLC SERPL-MCNC: 75 MG/DL
LDLC SERPL CALC-MCNC: 115 MG/DL
POTASSIUM BLD-SCNC: 4.2 MMOL/L (ref 3.5–5)
PROT SERPL-MCNC: 6.8 G/DL (ref 6–8)
SODIUM SERPL-SCNC: 141 MMOL/L (ref 136–145)
TRIGL SERPL-MCNC: 109 MG/DL

## 2021-11-19 PROCEDURE — 99397 PER PM REEVAL EST PAT 65+ YR: CPT | Performed by: FAMILY MEDICINE

## 2021-11-19 PROCEDURE — 80061 LIPID PANEL: CPT | Performed by: FAMILY MEDICINE

## 2021-11-19 PROCEDURE — 36415 COLL VENOUS BLD VENIPUNCTURE: CPT | Performed by: FAMILY MEDICINE

## 2021-11-19 PROCEDURE — 80053 COMPREHEN METABOLIC PANEL: CPT | Performed by: FAMILY MEDICINE

## 2021-11-19 RX ORDER — CITALOPRAM HYDROBROMIDE 10 MG/1
TABLET ORAL
Qty: 90 TABLET | Refills: 0 | Status: SHIPPED | OUTPATIENT
Start: 2021-11-19 | End: 2021-12-23

## 2021-11-19 RX ORDER — SIMVASTATIN 20 MG
20 TABLET ORAL AT BEDTIME
Qty: 90 TABLET | Refills: 3 | Status: SHIPPED | OUTPATIENT
Start: 2021-11-19 | End: 2022-11-22

## 2021-11-19 RX ORDER — METOPROLOL SUCCINATE 25 MG/1
25 TABLET, EXTENDED RELEASE ORAL 2 TIMES DAILY
Qty: 180 TABLET | Refills: 3 | Status: SHIPPED | OUTPATIENT
Start: 2021-11-19 | End: 2022-11-22

## 2021-11-19 ASSESSMENT — ACTIVITIES OF DAILY LIVING (ADL): CURRENT_FUNCTION: NO ASSISTANCE NEEDED

## 2021-11-19 NOTE — PROGRESS NOTES
"SUBJECTIVE:   Ynes Mart is a 66 year old female who presents for Preventive Visit.    She had her dermatology visit recently and another basal cell skin cancer was treated.  She has her mammogram scheduled for Monday.  She has all of her vaccines up-to-date and will be getting her second Shingrix vaccine in 2 to 6 months.    She still is having some breakthrough anxiety and while she is coping a little bit better with the 10 mg of citalopram we discussed the possibility of using a little bit higher dose and she was open to that possibility.  We will have her do 1-1/2 for couple weeks and then may be increased to 2 and then touch base with me in about 8 weeks to make sure we have not made anything worse.    Her tachycardia is very well managed with the metoprolol twice a day.       Are you in the first 12 months of your Medicare coverage?  No    Healthy Habits:     In general, how would you rate your overall health?  Good    Frequency of exercise:  6-7 days/week    Duration of exercise:  30-45 minutes    Do you usually eat at least 4 servings of fruit and vegetables a day, include whole grains    & fiber and avoid regularly eating high fat or \"junk\" foods?  No    Taking medications regularly:  Yes    Barriers to taking medications:  None    Medication side effects:  None    Ability to successfully perform activities of daily living:  No assistance needed    Home Safety:  No safety concerns identified    Hearing Impairment:  No hearing concerns    In the past 6 months, have you been bothered by leaking of urine? Yes    In general, how would you rate your overall mental or emotional health?  Good      PHQ-2 Total Score: 0    Additional concerns today:  No    Do you feel safe in your environment? Yes    Have you ever done Advance Care Planning? (For example, a Health Directive, POLST, or a discussion with a medical provider or your loved ones about your wishes): Yes, advance care planning is on file.       Fall " risk  Fallen 2 or more times in the past year?: No  Any fall with injury in the past year?: No    Cognitive Screening   1) Repeat 3 items (Leader, Season, Table)    2) Clock draw: ABNORMAL wrong time.  3) 3 item recall: Recalls 3 objects  Results: ABNORMAL clock, 1-2 items recalled: PROBABLE COGNITIVE IMPAIRMENT, **INFORM PROVIDER**    Mini-CogTM Copyright JULIETH Rosenthal. Licensed by the author for use in [facility]; reprinted with permission (rudolph@[facility]). All rights reserved.      Do you have sleep apnea, excessive snoring or daytime drowsiness?: no    Reviewed and updated as needed this visit by clinical staff  Tobacco  Allergies  Meds             Reviewed and updated as needed this visit by Provider               Social History     Tobacco Use     Smoking status: Never Smoker     Smokeless tobacco: Never Used   Substance Use Topics     Alcohol use: Yes     Alcohol/week: 2.0 standard drinks     Comment: Alcoholic Drinks/day: occasional     If you drink alcohol do you typically have >3 drinks per day or >7 drinks per week? No    Alcohol Use 11/19/2021   Prescreen: >3 drinks/day or >7 drinks/week? No       Current providers sharing in care for this patient include:   Patient Care Team:  Blanca Mace MD as PCP - General (Family Practice)  Blanca Mace MD as Assigned PCP    The following health maintenance items are reviewed in Epic and correct as of today:  Health Maintenance Due   Topic Date Due     ANNUAL REVIEW OF HM ORDERS  Never done     Lab work is in process  Mammogram Screening: Mammogram Screening: Recommended mammography every 1-2 years with patient discussion and risk factor consideration    Breast CA Risk Assessment (FHS-7) 11/19/2021   Do you have a family history of breast, colon, or ovarian cancer? No / Unknown         Mammogram Screening: Recommended mammography every 1-2 years with patient discussion and risk factor consideration  Pertinent mammograms are reviewed under the  "imaging tab.    Review of Systems  Constitutional, HEENT, cardiovascular, pulmonary, gi and gu systems are negative, except as otherwise noted.    OBJECTIVE:   /76   Pulse 71   Wt 65.5 kg (144 lb 6.4 oz)   SpO2 99%   BMI 24.03 kg/m   Estimated body mass index is 24.03 kg/m  as calculated from the following:    Height as of 11/18/20: 1.651 m (5' 5\").    Weight as of this encounter: 65.5 kg (144 lb 6.4 oz).  Physical Exam  GENERAL: healthy, alert and no distress  EYES: Eyes grossly normal to inspection, PERRL and conjunctivae and sclerae normal  HENT: ear canals and TM's normal, nose and mouth without ulcers or lesions  NECK: no adenopathy, no asymmetry, masses, or scars and thyroid normal to palpation  RESP: lungs clear to auscultation - no rales, rhonchi or wheezes  CV: regular rate and rhythm, normal S1 S2, no S3 or S4, no murmur, click or rub, no peripheral edema and peripheral pulses strong  ABDOMEN: soft, nontender, no hepatosplenomegaly, no masses and bowel sounds normal  MS: no gross musculoskeletal defects noted, no edema  SKIN: no suspicious lesions or rashes  NEURO: Normal strength and tone, mentation intact and speech normal  PSYCH: mentation appears normal, affect normal/bright    Diagnostic Test Results:  Labs reviewed in Epic    ASSESSMENT / PLAN:       ICD-10-CM    1. Routine general medical examination at a health care facility  Z00.00    2. Other hyperlipidemia  E78.49 Lipid Profile     Comprehensive metabolic panel     Lipid Profile     Comprehensive metabolic panel   3. Malignant melanoma of upper extremity, including shoulder, unspecified laterality (H)  C43.60    4. Anxiety  F41.9 citalopram (CELEXA) 10 MG tablet   5. Hyperlipidemia, unspecified hyperlipidemia type  E78.5 simvastatin (ZOCOR) 20 MG tablet     Comprehensive metabolic panel     Comprehensive metabolic panel   6. Medication management  Z79.899 citalopram (CELEXA) 10 MG tablet   7. Osteopenia, unspecified location  M85.80 DX " "Hip/Pelvis/Spine   8. Menopause  Z78.0    9. Other specified menopausal and perimenopausal disorders   N95.8 DX Hip/Pelvis/Spine   10. Grief reaction  F43.21 citalopram (CELEXA) 10 MG tablet   11. Palpitations  R00.2 metoprolol succinate ER (TOPROL-XL) 25 MG 24 hr tablet   12. Supraventricular tachycardia (H)  I47.1 metoprolol succinate ER (TOPROL-XL) 25 MG 24 hr tablet       Patient has been advised of split billing requirements and indicates understanding: Yes  COUNSELING:  Reviewed preventive health counseling, as reflected in patient instructions       Regular exercise       Healthy diet/nutrition       Vision screening       Dental care       Aspirin prophylaxis        Osteoporosis prevention/bone health       Colon cancer screening    Estimated body mass index is 24.03 kg/m  as calculated from the following:    Height as of 11/18/20: 1.651 m (5' 5\").    Weight as of this encounter: 65.5 kg (144 lb 6.4 oz).        She reports that she has never smoked. She has never used smokeless tobacco.      Appropriate preventive services were discussed with this patient, including applicable screening as appropriate for cardiovascular disease, diabetes, osteopenia/osteoporosis, and glaucoma.  As appropriate for age/gender, discussed screening for colorectal cancer, prostate cancer, breast cancer, and cervical cancer. Checklist reviewing preventive services available has been given to the patient.    Reviewed patients plan of care and provided an AVS. The Basic Care Plan (routine screening as documented in Health Maintenance) for Ynes meets the Care Plan requirement. This Care Plan has been established and reviewed with the Patient.    Counseling Resources:  ATP IV Guidelines  Pooled Cohorts Equation Calculator  Breast Cancer Risk Calculator  Breast Cancer: Medication to Reduce Risk  FRAX Risk Assessment  ICSI Preventive Guidelines  Dietary Guidelines for Americans, 2010  USDA's MyPlate  ASA Prophylaxis  Lung CA " Screening    Blanca Mace MD  [employer]    Identified Health Risks:

## 2021-11-22 ENCOUNTER — HOSPITAL ENCOUNTER (OUTPATIENT)
Dept: MAMMOGRAPHY | Facility: CLINIC | Age: 66
Discharge: HOME | End: 2021-11-22
Attending: FAMILY MEDICINE | Admitting: FAMILY MEDICINE
Payer: COMMERCIAL

## 2021-11-22 DIAGNOSIS — Z12.31 VISIT FOR SCREENING MAMMOGRAM: ICD-10-CM

## 2021-11-22 PROCEDURE — 77063 BREAST TOMOSYNTHESIS BI: CPT

## 2021-12-22 DIAGNOSIS — Z79.899 MEDICATION MANAGEMENT: ICD-10-CM

## 2021-12-22 DIAGNOSIS — F41.9 ANXIETY: ICD-10-CM

## 2021-12-22 DIAGNOSIS — F43.21 GRIEF REACTION: ICD-10-CM

## 2021-12-22 NOTE — TELEPHONE ENCOUNTER
Refill Request    Medication name:   Pending Prescriptions:                       Disp   Refills    citalopram (CELEXA) 10 MG tablet          90 tab*0            Si.5-2 tablets po daily as directed

## 2021-12-23 RX ORDER — CITALOPRAM HYDROBROMIDE 10 MG/1
TABLET ORAL
Qty: 90 TABLET | Refills: 0 | Status: SHIPPED | OUTPATIENT
Start: 2021-12-23 | End: 2022-01-20

## 2022-01-20 ENCOUNTER — VIRTUAL VISIT (OUTPATIENT)
Dept: FAMILY MEDICINE | Facility: CLINIC | Age: 67
End: 2022-01-20
Payer: COMMERCIAL

## 2022-01-20 DIAGNOSIS — F43.21 GRIEF REACTION: ICD-10-CM

## 2022-01-20 DIAGNOSIS — Z78.0 MENOPAUSE: ICD-10-CM

## 2022-01-20 DIAGNOSIS — F41.9 ANXIETY: ICD-10-CM

## 2022-01-20 DIAGNOSIS — Z13.9 SCREENING FOR CONDITION: Primary | ICD-10-CM

## 2022-01-20 DIAGNOSIS — Z79.899 MEDICATION MANAGEMENT: ICD-10-CM

## 2022-01-20 PROCEDURE — 99213 OFFICE O/P EST LOW 20 MIN: CPT | Mod: 95 | Performed by: FAMILY MEDICINE

## 2022-01-20 RX ORDER — CITALOPRAM HYDROBROMIDE 20 MG/1
20 TABLET ORAL DAILY
Qty: 90 TABLET | Refills: 3 | Status: SHIPPED | OUTPATIENT
Start: 2022-01-20 | End: 2022-08-25

## 2022-01-20 ASSESSMENT — PATIENT HEALTH QUESTIONNAIRE - PHQ9: SUM OF ALL RESPONSES TO PHQ QUESTIONS 1-9: 0

## 2022-01-20 NOTE — PROGRESS NOTES
Ynes is a 66 year old who is being evaluated via a billable video visit.      How would you like to obtain your AVS? MyChart  If the video visit is dropped, the invitation should be resent by: Send to e-mail at: [email or web address]  Will anyone else be joining your video visit? No     Video Start Time: 13:31    Assessment & Plan     Anxiety  Doing much better on 20 mg of citalopram  - citalopram (CELEXA) 20 MG tablet; Take 1 tablet (20 mg) by mouth daily    Medication management  This dosage increase to 20 mg works well  - citalopram (CELEXA) 20 MG tablet; Take 1 tablet (20 mg) by mouth daily    Grief reaction  This started with the death of her mother and her best friend in August 2020  - citalopram (CELEXA) 20 MG tablet; Take 1 tablet (20 mg) by mouth daily    Screening for condition  Her previous insurance would not pay for her to have a screening bone density so now that she has new insurance she would like to get that completed this year  - DX Hip/Pelvis/Spine; Future    Menopause     - DX Hip/Pelvis/Spine; Future     No follow-ups on file.November    Blanca Mace MD  [facility]   Ynes is a 66 year old who presents for the following health issues   HPI   She tried to increase her citalopram to 1-1/2 tablets of the 10 mg tablets and it was too hard to cut in half so she increased to 2 tablets and within a week her morning anxiety upon awakening was much better and she is not having breakthrough anxiety any longer. She sleeps pretty well and is doing quite well with that wants to continue that and I suggested for about a year and a half since all this started with a grief reaction in August 2020 if we consider this is the time that she has a good result we will have her do this for about a year and a half and then consider doing wean off this medication. Her next visit will be November for annual visit unless she needs to be seen sooner     Review of  Systems          Objective           Vitals:  No vitals were obtained today due to virtual visit.    Physical Exam   GENERAL: Healthy, alert and no distress  EYES: Eyes grossly normal to inspection.  No discharge or erythema, or obvious scleral/conjunctival abnormalities.  RESP: No audible wheeze, cough, or visible cyanosis.  No visible retractions or increased work of breathing.    SKIN: Visible skin clear. No significant rash, abnormal pigmentation or lesions.  NEURO: Cranial nerves grossly intact.  Mentation and speech appropriate for age.  PSYCH: Mentation appears normal, affect normal/bright, judgement and insight intact, normal speech and appearance well-groomed.           Video-Visit Details    Type of service:  Video Visit    Video End Time:13:37    Originating Location (pt. Location): Home    Distant Location (provider location):  [facility]     Platform used for Video Visit: PiniOn

## 2022-04-05 ENCOUNTER — ANCILLARY PROCEDURE (OUTPATIENT)
Dept: BONE DENSITY | Facility: CLINIC | Age: 67
End: 2022-04-05
Attending: FAMILY MEDICINE
Payer: COMMERCIAL

## 2022-04-05 DIAGNOSIS — Z78.0 MENOPAUSE: ICD-10-CM

## 2022-04-05 DIAGNOSIS — Z13.9 SCREENING FOR CONDITION: ICD-10-CM

## 2022-04-05 PROCEDURE — 77080 DXA BONE DENSITY AXIAL: CPT | Mod: TC | Performed by: RADIOLOGY

## 2022-05-17 ENCOUNTER — TRANSFERRED RECORDS (OUTPATIENT)
Dept: HEALTH INFORMATION MANAGEMENT | Facility: CLINIC | Age: 67
End: 2022-05-17
Payer: OTHER GOVERNMENT

## 2022-06-14 ENCOUNTER — VIRTUAL VISIT (OUTPATIENT)
Dept: URGENT CARE | Facility: CLINIC | Age: 67
End: 2022-06-14
Payer: COMMERCIAL

## 2022-06-14 DIAGNOSIS — U07.1 CLINICAL DIAGNOSIS OF COVID-19: Primary | ICD-10-CM

## 2022-06-14 PROCEDURE — 99213 OFFICE O/P EST LOW 20 MIN: CPT | Mod: 95

## 2022-06-14 NOTE — PROGRESS NOTES
"Ynes is a 66 year old who is being evaluated via a billable video visit.      Tested + this AM.  Sx started on Sunday 6/12.  Recent cruise to [place].  Allergy/sinus sx.    How would you like to obtain your AVS? MyChart  If the video visit is dropped, the invitation should be resent by: Send to e-mail at: [email or web address]  Will anyone else be joining your video visit? No    Video Start Time: 3:32 PM    Assessment & Plan     Clinical diagnosis of COVID-19    - nirmatrelvir and ritonavir (PAXLOVID) therapy pack; Take 3 tablets by mouth 2 times daily for 5 days Take 2 Nirmatrelvir tablets and 1 Ritonavir tablet twice daily for 5 days.    COVID-19 positive patient.  Encounter for consideration of medication intervention. Patient does qualify for a prescription. Full discussion with patient including medication options, risks and benefits. Potential drug interactions reviewed with patient.     Treatment Planned Paxlovid, Rx sent to [facility]  [facility]   [phone]    [address] [place], [place] [id number]    Hours:  Mon-Fri: 8:30a - 5:00p  Sat-Sun: 9:00a - 1:00p    Drive-thru available   Temporary change to home medications:   Hold simvastatin and Flonase while on Paxlovid.    Estimated body mass index is 24.03 kg/m  as calculated from the following:    Height as of 11/18/20: 1.651 m (5' 5\").    Weight as of 11/19/21: 65.5 kg (144 lb 6.4 oz).  GFR Estimate   Date Value Ref Range Status   11/19/2021 86 >60 mL/min/1.73m2 Final     Comment:     As of July 11, 2021, eGFR is calculated by the CKD-EPI creatinine equation, without race adjustment. eGFR can be influenced by muscle mass, exercise, and diet. The reported eGFR is an estimation only and is only applicable if the renal function is stable.   11/18/2020 >60 >60 mL/min/1.73m2 Final     Sarah Narvaez MD  [facility]  [facility]    Subjective   Ynes is a 66 year old who presents for the " following health issues     HPI     Tested + this AM.  Sx started on Sunday 6/12.  Recent cruise to [place].  Allergy/sinus sx.  COVID vaccinated and boosted.      Review of Systems   Constitutional, HEENT, cardiovascular, pulmonary, GI, , musculoskeletal, neuro, skin, endocrine and psych systems are negative, except as otherwise noted.      Objective           Vitals:  No vitals were obtained today due to virtual visit.    Physical Exam   GENERAL: Healthy, alert and no distress  EYES: Eyes grossly normal to inspection.  No discharge or erythema, or obvious scleral/conjunctival abnormalities.  RESP: No audible wheeze, cough, or visible cyanosis.  No visible retractions or increased work of breathing.    SKIN: Visible skin clear. No significant rash, abnormal pigmentation or lesions.  NEURO: Cranial nerves grossly intact.  Mentation and speech appropriate for age.  PSYCH: Mentation appears normal, affect normal/bright, judgement and insight intact, normal speech and appearance well-groomed.                Video-Visit Details    Type of service:  Video Visit    Video End Time:3:42 PM    Originating Location (pt. Location): Home    Distant Location (provider location):  [facility]     Platform used for Video Visit: [employer]

## 2022-08-09 DIAGNOSIS — Z79.899 MEDICATION MANAGEMENT: ICD-10-CM

## 2022-08-09 DIAGNOSIS — F43.21 GRIEF REACTION: ICD-10-CM

## 2022-08-09 DIAGNOSIS — F41.9 ANXIETY: ICD-10-CM

## 2022-08-10 RX ORDER — CITALOPRAM HYDROBROMIDE 20 MG/1
20 TABLET ORAL DAILY
Qty: 90 TABLET | Refills: 0 | Status: CANCELLED | OUTPATIENT
Start: 2022-08-10

## 2022-08-10 NOTE — TELEPHONE ENCOUNTER
"Former patient of [facility] & has not established care with another provider.  Please assign refill request to covering provider per clinic standard process.    Routing refill request to provider for review/approval because:  Early refill requested with pharmacy change.  Refills have been adjusted.  No PCP    Last Written Prescription Date:  1/20/22  Last Fill Quantity: 90,  # refills: 3   Last office visit provider:  1/20/22     Requested Prescriptions   Pending Prescriptions Disp Refills     citalopram (CELEXA) 20 MG tablet 90 tablet 0     Sig: Take 1 tablet (20 mg) by mouth daily       SSRIs Protocol Passed - 8/10/2022  2:34 PM        Passed - Recent (12 mo) or future (30 days) visit within the authorizing provider's specialty     Patient has had an office visit with the authorizing provider or a provider within the authorizing providers department within the previous 12 mos or has a future within next 30 days. See \"Patient Info\" tab in inbasket, or \"Choose Columns\" in Meds & Orders section of the refill encounter.              Passed - Medication is active on med list        Passed - Patient is age 18 or older        Passed - No active pregnancy on record        Passed - No positive pregnancy test in last 12 months             Benji Serrato RN 08/10/22 2:35 PM  "

## 2022-08-23 DIAGNOSIS — Z79.899 MEDICATION MANAGEMENT: ICD-10-CM

## 2022-08-23 DIAGNOSIS — F41.9 ANXIETY: ICD-10-CM

## 2022-08-23 DIAGNOSIS — F43.21 GRIEF REACTION: ICD-10-CM

## 2022-08-25 RX ORDER — CITALOPRAM HYDROBROMIDE 20 MG/1
20 TABLET ORAL DAILY
Qty: 90 TABLET | Refills: 0 | Status: SHIPPED | OUTPATIENT
Start: 2022-08-25 | End: 2022-11-22

## 2022-08-25 NOTE — TELEPHONE ENCOUNTER
"Routing refill request to provider for review/approval because:  Former patient of [facility] & has not established care with another provider.  Please assign refill request to covering provider per Clinic standard process.      Last Written Prescription Date:  1/20/22- pharmacy change  Last Fill Quantity: 90,  # refills: 3   Last office visit provider:  1/20/22     Requested Prescriptions   Pending Prescriptions Disp Refills     citalopram (CELEXA) 20 MG tablet 90 tablet 3     Sig: Take 1 tablet (20 mg) by mouth daily       SSRIs Protocol Passed - 8/23/2022  4:54 PM        Passed - Recent (12 mo) or future (30 days) visit within the authorizing provider's specialty     Patient has had an office visit with the authorizing provider or a provider within the authorizing providers department within the previous 12 mos or has a future within next 30 days. See \"Patient Info\" tab in inbasket, or \"Choose Columns\" in Meds & Orders section of the refill encounter.              Passed - Medication is active on med list        Passed - Patient is age 18 or older        Passed - No active pregnancy on record        Passed - No positive pregnancy test in last 12 months             Alexandra Monahan RN 08/25/22 10:16 AM  "

## 2022-09-07 NOTE — TELEPHONE ENCOUNTER
Citalopram 20 mg refilled on 08/25/22 to dispense at 90 with 0 refills by Dr. Wendy Rand.     Patient is scheduled to see Sil on 11/22/22 @8:40 AM.     Macie Boss CMA

## 2022-09-12 ENCOUNTER — ALLIED HEALTH/NURSE VISIT (OUTPATIENT)
Dept: FAMILY MEDICINE | Facility: CLINIC | Age: 67
End: 2022-09-12
Payer: COMMERCIAL

## 2022-09-12 DIAGNOSIS — Z48.02 ENCOUNTER FOR REMOVAL OF SUTURES: Primary | ICD-10-CM

## 2022-09-12 PROCEDURE — 99207 PR NO CHARGE NURSE ONLY: CPT

## 2022-09-12 NOTE — PROGRESS NOTES
Ynes Mart presents to the clinic for removal of sutures and sutures.     The patient has had sutures in place for 12 days.   There has been no patient reported signs or symptoms of infection - Pt was started on 10 day regimen of Keflex on 8/31/22. Patient completed this prior to coming in for removal.  Some drainage noted on the bottom of laceration - discussed with MD, Dr. Warren Gustafson, [place] for removal. No infection at this time.     26 sutures are seen and located on the right lower leg.   Tetanus status is up to date.    All sutures and sutures,staples, steri strips were removed today - steri-strips placed at the base of laceration prior to leaving.   Dr. Warren Gustafson did assist with removing 3 sutures.     Routine wound care discussed by the RN.   Advised patient to follow-up as needed - discussed s/s of infection.   Pt does have follow-up scheduled in a month - advised to notify care team if any concerns prior to that visit.     Christina Kumar RN, BSN  [facility]

## 2022-09-25 ENCOUNTER — HEALTH MAINTENANCE LETTER (OUTPATIENT)
Age: 67
End: 2022-09-25

## 2022-10-10 ENCOUNTER — OFFICE VISIT (OUTPATIENT)
Dept: VASCULAR SURGERY | Facility: CLINIC | Age: 67
End: 2022-10-10
Attending: NURSE PRACTITIONER
Payer: COMMERCIAL

## 2022-10-10 VITALS
BODY MASS INDEX: 24.31 KG/M2 | RESPIRATION RATE: 12 BRPM | DIASTOLIC BLOOD PRESSURE: 74 MMHG | OXYGEN SATURATION: 98 % | WEIGHT: 146.1 LBS | HEART RATE: 71 BPM | SYSTOLIC BLOOD PRESSURE: 122 MMHG | TEMPERATURE: 98.4 F

## 2022-10-10 DIAGNOSIS — L08.9 TRAUMATIC OPEN WOUND OF LOWER LEG WITH INFECTION, RIGHT, INITIAL ENCOUNTER: Primary | ICD-10-CM

## 2022-10-10 DIAGNOSIS — S81.801A TRAUMATIC OPEN WOUND OF LOWER LEG WITH INFECTION, RIGHT, INITIAL ENCOUNTER: Primary | ICD-10-CM

## 2022-10-10 PROCEDURE — 99203 OFFICE O/P NEW LOW 30 MIN: CPT | Mod: 25 | Performed by: NURSE PRACTITIONER

## 2022-10-10 PROCEDURE — 11042 DBRDMT SUBQ TIS 1ST 20SQCM/<: CPT | Performed by: NURSE PRACTITIONER

## 2022-10-10 PROCEDURE — G0463 HOSPITAL OUTPT CLINIC VISIT: HCPCS | Mod: 25 | Performed by: NURSE PRACTITIONER

## 2022-10-10 RX ORDER — LIFITEGRAST 50 MG/ML
SOLUTION/ DROPS OPHTHALMIC
COMMUNITY
Start: 2022-05-18 | End: 2023-07-12

## 2022-10-10 RX ORDER — HYDROCODONE BITARTRATE AND ACETAMINOPHEN 5; 325 MG/1; MG/1
1 TABLET ORAL
COMMUNITY
Start: 2022-08-31 | End: 2022-11-22

## 2022-10-10 ASSESSMENT — PAIN SCALES - GENERAL: PAINLEVEL: NO PAIN (0)

## 2022-10-10 NOTE — LETTER
"[facility]  [address] [place] [address] [address]  [place], [place] [address]  [phone]      Fax [phone]    [facility]           Fax: [phone]            Customer Service: [phone]    Wound Dressing Rx and Order Form  Order Status: new  Verbal: Nuria  Date: October 10, 2022     Ynes Mart  Gender: female  : 1955  [address] [facility] [address]  [phone] (home)     Medical Record: [id number]  Primary Care Provider: Blanca Mace      ICD-10-CM    1. Traumatic open wound of lower leg with infection, right, initial encounter  S81.801A DEBRIDE SKIN/SUBQ TISSUE    L08.9 Wound care            Insurance Info:  INSURER: Payor: /LIZZY / Plan: [employer] / Product Type: Indemnity /   Policy ID#:  [id number]  SECONDARY INSURANCE:  [facility]  Secondary Policy ID#:  [id number]        Physician Info:   Name:  GORDO DUGGAN     Dept Address/Phones:   [address] [place] [address], [address]  [facility] [id number] [phone]  Fax: [phone]    Lymphedema circumferential measurements (in cm):  No flowsheet data found.      Wound info:  VASC Wound Right upper shin (Active)   Pre Size Length 3 10/10/22 1400   Pre Size Width 1.6 10/10/22 1400   Pre Size Depth 0.4 10/10/22 1400   Pre Total Sq cm 4.8 10/10/22 1400   Description slough 10/10/22 1400   Number of days: 0        Drainage: moderate  Thickness:  full  Duration of Need: 30 DAYS  Days Supply: 30 DAYS  Start Date: 10/10/22  Starter Kit: ancillary  Qualifying wound/Debridement: Yes      Dressing Type Brand Size Number of pieces Frequency of change    Primary manuka honey hd super lite  2\"x2\" 4 Every 3 days and as needed    secondary Silicone bordered foam adhesive  4x4\" 12 Every 3 days and as needed      No substitutions preferred. Call [phone].     Wound Care Instructions    Every 3 days , Cleanse your right shin wound(s) with Dilute hibiclens 30cc in 500cc NS.    Pat Dry with non-sterile " "gauze    Apply Lotion to the intact skin surrounding your wound and other dry skin locations. Some good lotions include: Remedy Skin Repair Cream, Sarna, Vanicream or Cetaphil    Primary Dressing: Apply medihoney alginate or manuka honey hd super lite 2\"x2\"into/onto the wounds; cut to fit to the size of the wound    Secondary dressing: Cover with bordered mepilex 4x4    OK to forward to covered supplier.    Electronically Signed Physician:  GORDO UDGGAN             Date: October 10, 2022    "

## 2022-10-10 NOTE — PROGRESS NOTES
[facility] Consult Note    Date of Service: October 10, 2022     Requesting Provider: Blanca Mace MD    Chief Complaint: traumatic wound to right leg and edema    History of Present Illness: Ynes Mart is being seen at [facility] today regarding traumatic wound to right leg and edema. They arrive to the clinic today alone; she was able to walk to the exam room. The patient reports that at the end of august she was getting off her pontoon and fell; striking her right leg; she required sutures; she was treated with 10 day course of keflex; tolerated well; has completed this. Was currently/previously using vaseline and gauze. Reports pain of 0/10; currently using apap for pain. Has used nothing as compression in the past, is currently using nothing for compression. Denies any fevers, chills, or generalized ill feeling. +history of cancer melanoma left arm. Sleeps in a bed/recliner with legs elevated she does find that she has to hang her legs down over the side of the bed she feels this is due to restless legs this does not happen every night; denies pain in her calves with walking. Pt no longer has their uterus and ovaries, they deny any abnormal vaginal bleeding. Denies history of DVT, Joint Replacement, Cellulitis and Vein Procedures. I personally reviewed outside imaging, lab work, and progress noted through Care Everywhere and outside records .      Review of Systems:   Constitutional:  Negative   EENTM: negative for glasses;  negative [place]  GI:  negative for nausea/vomiting;   negative for constipation   negative diarrhea;   negative for fecal incontinence  negative weight loss  :   negative dysuria,  negative incontinence  MS: patient is ambulatory;  does not use assistive devices  Cardiac: positive history of SVT; on beta blocker for this  Respiratory:  negative SOB  Endocrine:  negative  diabetes  Psych: negative  depression/anxiety    Past Medical History:    Past Medical History:   Diagnosis Date     Hyperlipidemia      Melanoma (H) 05/2018    Left Arm     SVT (supraventricular tachycardia) (H)         Surgical History:   Past Surgical History:   Procedure Laterality Date     HYSTERECTOMY  1995     OOPHORECTOMY  1995     [facility] REMOVAL OF OVARY(S)      Description: Oophorectomy;  Recorded: 09/09/2013;  Comments: cyst on ovary and endometriosis     ZZC TOTAL ABDOM HYSTERECTOMY      Description: Total Abdominal Hysterectomy;  Proc Date: 01/01/1999;  Comments: Dr. Smith at [facility] in [place], Indication was fibroids and endometriosis        Medications:   Current Outpatient Medications   Medication Sig     ascorbic acid (VITAMIN C) 1000 MG tablet Take 500 mg by mouth daily      calcium carbonate-vitamin D (OS-CONTRERAS WITH D) 500-200 MG-UNIT tablet Take 1 tablet by mouth     citalopram (CELEXA) 20 MG tablet Take 1 tablet (20 mg) by mouth daily     fluticasone (FLONASE) 50 mcg/actuation nasal spray [FLUTICASONE (FLONASE) 50 MCG/ACTUATION NASAL SPRAY] 2 sprays into each nostril daily.     HYDROcodone-acetaminophen (NORCO) 5-325 MG tablet Take 1 tablet by mouth     Lactobacillus rhamnosus GG (CULTURELLE) 10-15 Billion cell capsule [LACTOBACILLUS RHAMNOSUS GG (CULTURELLE) 10-15 BILLION CELL CAPSULE] Take 1 capsule by mouth daily.     magnesium 30 mg tablet Take 250 mg by mouth daily      metoprolol succinate ER (TOPROL-XL) 25 MG 24 hr tablet Take 1 tablet (25 mg) by mouth 2 times daily     multivitamin therapeutic (THERAGRAN) tablet [MULTIVITAMIN THERAPEUTIC (THERAGRAN) TABLET] Take 1 tablet by mouth daily.     OMEGA-3/DHA/EPA/FISH OIL (FISH OIL-OMEGA-3 FATTY ACIDS) 300-1,000 mg capsule [OMEGA-3/DHA/EPA/FISH OIL (FISH OIL-OMEGA-3 FATTY ACIDS) 300-1,000 MG CAPSULE] Take 2 g by mouth daily.     omeprazole 20 mg TbEC [OMEPRAZOLE 20 MG TBEC] Take by mouth Daily before breakfast.     simvastatin (ZOCOR) 20 MG tablet Take 1 tablet (20 mg) by mouth At Bedtime     XIIDRA 5 %  opthalmic solution      No current facility-administered medications for this visit.       Allergies: No Known Allergies    Family history:   Family History   Problem Relation Age of Onset     Heart Disease Mother         MI @ 94     Hyperlipidemia Mother      Hypertension Mother      Chronic Obstructive Pulmonary Disease Father      Heart Disease Brother 66.00        massive MI     Diabetes Brother         type 2     Diabetes Brother         type 2     Diabetes Sister      Kidney Disease Sister      Heart Disease Sister      Ovarian Cancer Maternal Aunt 80.00        Social History:   Social History     Socioeconomic History     Marital status:      Spouse name: Not on file     Number of children: 2     Years of education: Not on file     Highest education level: Not on file   Occupational History     Not on file   Tobacco Use     Smoking status: Never     Smokeless tobacco: Never   Substance and Sexual Activity     Alcohol use: Yes     Alcohol/week: 2.0 standard drinks     Comment: Alcoholic Drinks/day: occasional     Drug use: No     Sexual activity: Yes     Birth control/protection: Post-menopausal, Surgical   Other Topics Concern     Not on file   Social History Narrative    Yazidism   retired [employer]     Social Determinants of Health     Financial Resource Strain: Not on file   Food Insecurity: Not on file   Transportation Needs: Not on file   Physical Activity: Not on file   Stress: Not on file   Social Connections: Not on file   Intimate Partner Violence: Not on file   Housing Stability: Not on file        Physical Exam  Vitals: /74   Pulse 71   Temp 98.4  F (36.9  C)   Resp 12   Wt 146 lb 1.6 oz (66.3 kg)   SpO2 98%   BMI 24.31 kg/m    Weight is 146 lbs 1.6 oz          Body mass index is 24.31 kg/m .  General: This is a 66 year old female who appears their reported age, not in acute distress  Head: normocephalic, Atraumatic; not wearing glasses; non-icteric sclera; no exudate;no  hearing loss   Respiratory: Clear throughout all lung fields; unlabored breathing; no cough   Cardiac: Regular, Rate and Rhythm, no murmurs appreciated   Skin: Uniformly warm and dry  Psych: Alert and oriented x4; calm and cooperative throughout exam  Wound #1 Location: right shin  Size: 3L x 1.6W x 0.3depth.  No sinus tract present, Wound base: slough; +epibole  No underminingpresent. Wound is full thickness. There is moderate drainage. Periwound: no denudement, erythema, induration, maceration or warmth.        Sensation: Intact to pinprick and light touch throughout lower extremities bilaterally       Peripheral Vascular: normal dorsalis pedis, posterior tibial pulses to right foot , using a handheld doppler these were  in nature.  Good capillary refill. No unusual venous distention. Negative for spider veins, telangiectasias, hemosiderin deposition or hyperpigmentation and fibrosis or scarring      Circumferential volume measures:        No flowsheet data found.    Ulceration(s)/Wound(s):     VASC Wound Right upper shin (Active)   Pre Size Length 3 10/10/22 1400   Pre Size Width 1.6 10/10/22 1400   Pre Size Depth 0.4 10/10/22 1400   Pre Total Sq cm 4.8 10/10/22 1400   Description slough 10/10/22 1400       Laboratory studies:     I personally reviewed the following lab results today and those on care everywhere, if indicated     CRP   Date Value Ref Range Status   09/17/2020 <0.1 0.0 - 0.8 mg/dL Final      Erythrocyte Sedimentation Rate   Date Value Ref Range Status   09/17/2020 6 0 - 20 mm/hr Final      Last Renal Panel:  Sodium   Date Value Ref Range Status   11/19/2021 141 136 - 145 mmol/L Final     Potassium   Date Value Ref Range Status   11/19/2021 4.2 3.5 - 5.0 mmol/L Final     Chloride   Date Value Ref Range Status   11/19/2021 105 98 - 107 mmol/L Final     Carbon Dioxide (CO2)   Date Value Ref Range Status   11/19/2021 29 22 - 31 mmol/L Final     Anion Gap   Date Value Ref Range Status   11/19/2021 7 5  - 18 mmol/L Final     Glucose   Date Value Ref Range Status   11/19/2021 88 70 - 125 mg/dL Final     Urea Nitrogen   Date Value Ref Range Status   11/19/2021 10 8 - 22 mg/dL Final     Creatinine   Date Value Ref Range Status   11/19/2021 0.72 0.60 - 1.10 mg/dL Final     GFR Estimate   Date Value Ref Range Status   11/19/2021 86 >60 mL/min/1.73m2 Final     Comment:     As of July 11, 2021, eGFR is calculated by the CKD-EPI creatinine equation, without race adjustment. eGFR can be influenced by muscle mass, exercise, and diet. The reported eGFR is an estimation only and is only applicable if the renal function is stable.   11/18/2020 >60 >60 mL/min/1.73m2 Final     Calcium   Date Value Ref Range Status   11/19/2021 9.8 8.5 - 10.5 mg/dL Final     Albumin   Date Value Ref Range Status   11/19/2021 4.3 3.5 - 5.0 g/dL Final      Lab Results   Component Value Date    WBC 5.8 09/17/2020     Lab Results   Component Value Date    RBC 4.45 09/17/2020     Lab Results   Component Value Date    HGB 14.4 09/17/2020     Lab Results   Component Value Date    HCT 42.2 09/17/2020     No components found for: MCT  Lab Results   Component Value Date    MCV 95 09/17/2020     Lab Results   Component Value Date    MCH 32.4 09/17/2020     Lab Results   Component Value Date    MCHC 34.2 09/17/2020     Lab Results   Component Value Date    RDW 11.1 09/17/2020     Lab Results   Component Value Date     09/17/2020      No results found for: A1C   TSH   Date Value Ref Range Status   09/17/2020 2.06 0.30 - 5.00 uIU/mL Final      No results found for: VITDT       Impression:    No diagnosis found.    10/10/2022 right shin          Assessment/Plan:  1. Debridement: After discussion of risk factors and verbal consent was obtained 2% Lidocaine HCL jelly was applied, under clean conditions, the right shin ulceration(s) were debrided using currette. Devitalized and nonviable tissue, along with any fibrin and slough, was removed to improve  granulation tissue formation, stimulate wound healing, decrease overall bacteria load, disrupt biofilm formation and decrease edge senescence.  Total excisional debridement was 4.8 sq cm from the epidermis/dermis area or into the subcutaneous tissue with a depth of 0.3 cm.   Ulcers were improved afterwards and .  Measures were unchanged after debridement.    2. Treatment: wound treatment will include irrigation and dressings to promote autolytic debridement which will include: stop vaseline and gauze; go to dilute hibiclens wash; medihoney; bordered foam; change every 3 weeks; pt going on vacation next week    If for some reason the patient is not able to get your dressing(s) changed as outlined above (due to illness, lack of supplies, lack of help) please do the following: remove old, soiled dressings; wash the wounds with saline; pat dry; apply ABD pad or other absorbant pad and secure with rolled gauze; avoid tape directly on your skin; Patient instructed to call the clinic as soon as possible to let us know what the current issues are in receiving wound care.    3. Edema. Will use tubular compression and elevation    If a 2 layer or 4 layer compression wrap is being used; these are safe to have on for ONLY 7 days. If for some reason the patient is not able to get the wrap(s) changed (due to illness; lack of supplies, lack of help, lack of transportation) please do the following: unwrap the old 2 or 4 layer compression wrap; avoid using scissors as you could cut your skin and cause wounds; use tubular compression when available. Call to reschedule your home care or clinic visit appointment as soon as possible.    4. Offloading: na    5. Nutrition: na    Patient to return to clinic in 2-4 week(s) for re-evaluation. They were instructed to call the clinic sooner with any further questions or concerns. Answered all questions.          Maricel Nix NP   [facility]  [phone]      This note  was electronically signed by Maricel Nix NP

## 2022-10-10 NOTE — PATIENT INSTRUCTIONS
"Wound care supplies were ordered today through [employer] and if you are not receiving your supplies or have a question on your bill please contact Lara Reynolds at [phone]. Please allow 2-5 business days for delivery of supplies.         Wound Care Instructions    Every 3 days , Cleanse your right shin wound(s) with Dilute hibiclens 30cc in 500cc NS.    Pat Dry with non-sterile gauze    Apply Lotion to the intact skin surrounding your wound and other dry skin locations. Some good lotions include: Remedy Skin Repair Cream, Sarna, Vanicream or Cetaphil    Primary Dressing: Apply medihoney alginate into/onto the wounds; cut to fit to the size of the wound    Secondary dressing: Cover with bordered mepilex 4x4    Secure with non-sterile roll gauze (4\" x 75\" roll) and tape (1\" roll tape) as needed; avoid adhesive directly on the skin    Compression: tubular compression from base of toes to just below the knee    It is not ok to get your wound wet in the bath or shower      If for some reason you are not able to get your dressing(s) changed as outlined above (due to illness, lack of supplies, lack of help) please do the following: remove old, soiled dressings; wash the wounds with saline; pat dry; apply ABD pad or other absorbant pad and secure with rolled gauze; avoid tape directly on your skin; Call the clinic as soon as possible to let us know what the current issues are in receiving wound care [phone].      SEEK MEDICAL CARE IF:  You have an increase in swelling, pain, or redness around the wound.  You have an increase in the amount of pus coming from the wound.  There is a bad smell coming from the wound.  The wound appears to be worsening/enlarging  You have a fever greater than 101.5 F      It is ok to continue current wound care treatment/products for the next 2-3 days until new wound care supplies are ordered and arrive. If longer than this please contact our office at [phone].    If you have a 2 " layer or 4 layer compression wrap on these are safe to have on for ONLY 7 days. If for some reason you are not able to get the wrap(s) changed (due to illness; lack of supplies, lack of help, lack of transportation) please do the following: unwrap the old 2 or 4 layer compression wrap; avoid using scissors as you could cut your skin and cause wounds; use tubular compression when available. Call to reschedule your home care or clinic visit appointment as soon as possible.    Please NOTE: if you are 15 minutes late to your clinic appointment you will have to be rescheduled. Please call our clinic as soon as possible if you know you will not be able to get to your appointment at [phone].    If you fail to show up to 3 scheduled clinic appointments you will be dismissed from our clinic.    It is recommended that you do not get your ulcer wet when showering.  Listed below are several ways of keeping it dry when you shower.     1. Wrap it with [employer] plastic wrap.  It can be found in the stores where the plastic wraps or tin foil is kept.               2.  Some people take a bath and hang their leg/foot out of the tub.                        3  Put your leg in a plastic bag and tape it on.           4. You can purchase a shower cover for casts at some pharmacies and through the Internet.            5. Take a Bed Bath or wash up at the sink               We want to hear from you!  In the next few weeks, you should receive a call or email to complete a survey about your visit at [facility]. Please help us improve your appointment experience by letting us know how we did today. We strive to make your experience good and value any ways in which we could do better.      We value your input and suggestions.    Thank you for choosing the [facility]!

## 2022-10-12 ENCOUNTER — TRANSFERRED RECORDS (OUTPATIENT)
Dept: HEALTH INFORMATION MANAGEMENT | Facility: CLINIC | Age: 67
End: 2022-10-12

## 2022-10-24 ENCOUNTER — OFFICE VISIT (OUTPATIENT)
Dept: VASCULAR SURGERY | Facility: CLINIC | Age: 67
End: 2022-10-24
Attending: NURSE PRACTITIONER
Payer: COMMERCIAL

## 2022-10-24 VITALS
SYSTOLIC BLOOD PRESSURE: 132 MMHG | TEMPERATURE: 98 F | RESPIRATION RATE: 12 BRPM | BODY MASS INDEX: 24.3 KG/M2 | WEIGHT: 146 LBS | DIASTOLIC BLOOD PRESSURE: 86 MMHG | OXYGEN SATURATION: 98 % | HEART RATE: 72 BPM

## 2022-10-24 DIAGNOSIS — S81.801D TRAUMATIC OPEN WOUND OF RIGHT LOWER LEG, SUBSEQUENT ENCOUNTER: Primary | ICD-10-CM

## 2022-10-24 PROCEDURE — 11042 DBRDMT SUBQ TIS 1ST 20SQCM/<: CPT | Performed by: NURSE PRACTITIONER

## 2022-10-24 ASSESSMENT — PAIN SCALES - GENERAL: PAINLEVEL: NO PAIN (0)

## 2022-10-24 NOTE — PROGRESS NOTES
Follow up Vascular Visit       Date of Service:10/24/22      Chief Complaint: right shin traumatic wound      Pt returns to [facility] with regards to their right leg traumatic wound. Wound sustained after falling off pontoon onto dock. They arrive today alone; she was able to walk to the exam room. Just returned from trip to [place] . They are currently using medihoney; gauze; rolled gauze to the wounds. This is being done by pt every 2-3 days. They are using nothing for compression. They are feeling well today. Denies fevers, chills. No shortness of breath.     Allergies: No Known Allergies    Medications:   Current Outpatient Medications:      ascorbic acid (VITAMIN C) 1000 MG tablet, Take 500 mg by mouth daily , Disp: , Rfl:      calcium carbonate-vitamin D (OS-CONTRERAS WITH D) 500-200 MG-UNIT tablet, Take 1 tablet by mouth, Disp: , Rfl:      citalopram (CELEXA) 20 MG tablet, Take 1 tablet (20 mg) by mouth daily, Disp: 90 tablet, Rfl: 0     fluticasone (FLONASE) 50 mcg/actuation nasal spray, [FLUTICASONE (FLONASE) 50 MCG/ACTUATION NASAL SPRAY] 2 sprays into each nostril daily., Disp: , Rfl:      HYDROcodone-acetaminophen (NORCO) 5-325 MG tablet, Take 1 tablet by mouth, Disp: , Rfl:      Lactobacillus rhamnosus GG (CULTURELLE) 10-15 Billion cell capsule, [LACTOBACILLUS RHAMNOSUS GG (CULTURELLE) 10-15 BILLION CELL CAPSULE] Take 1 capsule by mouth daily., Disp: , Rfl:      magnesium 30 mg tablet, Take 250 mg by mouth daily , Disp: , Rfl:      metoprolol succinate ER (TOPROL-XL) 25 MG 24 hr tablet, Take 1 tablet (25 mg) by mouth 2 times daily, Disp: 180 tablet, Rfl: 3     multivitamin therapeutic (THERAGRAN) tablet, [MULTIVITAMIN THERAPEUTIC (THERAGRAN) TABLET] Take 1 tablet by mouth daily., Disp: , Rfl:      OMEGA-3/DHA/EPA/FISH OIL (FISH OIL-OMEGA-3 FATTY ACIDS) 300-1,000 mg capsule, [OMEGA-3/DHA/EPA/FISH OIL (FISH OIL-OMEGA-3 FATTY ACIDS) 300-1,000 MG CAPSULE] Take 2 g by mouth daily.,  Disp: , Rfl:      omeprazole 20 mg TbEC, [OMEPRAZOLE 20 MG TBEC] Take by mouth Daily before breakfast., Disp: , Rfl:      simvastatin (ZOCOR) 20 MG tablet, Take 1 tablet (20 mg) by mouth At Bedtime, Disp: 90 tablet, Rfl: 3     XIIDRA 5 % opthalmic solution, , Disp: , Rfl:     History:   Past Medical History:   Diagnosis Date     Hyperlipidemia      Melanoma (H) 05/2018    Left Arm     SVT (supraventricular tachycardia) (H)        Physical Exam:    /86   Pulse 72   Temp 98  F (36.7  C)   Resp 12   Wt 146 lb (66.2 kg)   SpO2 98%   BMI 24.30 kg/m      General:  Patient presents to clinic in no apparent distress.  Head: normocephalic atraumatic  Psychiatric:  Alert and oriented x3.   Respiratory: unlabored breathing; no cough  Integumentary:  Skin is uniformly warm, dry and pink.    Wound #1 Location: right shin Size:1L x 0.8W x 0.2cm depth.  No sinus tract present, Wound base: slough +epibole; no undermining present. Wound is full thickness. There is moderate drainage. Periwound: no denudement, erythema, induration, maceration or warmth.      VASC Wound Right upper shin (Active)   Pre Size Length 1 10/24/22 1100   Pre Size Width 0.8 10/24/22 1100   Pre Size Depth 0.2 10/24/22 1100   Pre Total Sq cm 0.8 10/24/22 1100   Description slough 10/10/22 1400   Number of days: 14            Circumferential volume measures:      No flowsheet data found.    Labs:    I personally reviewed the following lab results today and those on care everywhere    CRP   Date Value Ref Range Status   09/17/2020 <0.1 0.0 - 0.8 mg/dL Final      Erythrocyte Sedimentation Rate   Date Value Ref Range Status   09/17/2020 6 0 - 20 mm/hr Final      Last Renal Panel:  Sodium   Date Value Ref Range Status   11/19/2021 141 136 - 145 mmol/L Final     Potassium   Date Value Ref Range Status   11/19/2021 4.2 3.5 - 5.0 mmol/L Final     Chloride   Date Value Ref Range Status   11/19/2021 105 98 - 107 mmol/L Final     Carbon Dioxide (CO2)   Date  Value Ref Range Status   11/19/2021 29 22 - 31 mmol/L Final     Anion Gap   Date Value Ref Range Status   11/19/2021 7 5 - 18 mmol/L Final     Glucose   Date Value Ref Range Status   11/19/2021 88 70 - 125 mg/dL Final     Urea Nitrogen   Date Value Ref Range Status   11/19/2021 10 8 - 22 mg/dL Final     Creatinine   Date Value Ref Range Status   11/19/2021 0.72 0.60 - 1.10 mg/dL Final     GFR Estimate   Date Value Ref Range Status   11/19/2021 86 >60 mL/min/1.73m2 Final     Comment:     As of July 11, 2021, eGFR is calculated by the CKD-EPI creatinine equation, without race adjustment. eGFR can be influenced by muscle mass, exercise, and diet. The reported eGFR is an estimation only and is only applicable if the renal function is stable.   11/18/2020 >60 >60 mL/min/1.73m2 Final     Calcium   Date Value Ref Range Status   11/19/2021 9.8 8.5 - 10.5 mg/dL Final     Albumin   Date Value Ref Range Status   11/19/2021 4.3 3.5 - 5.0 g/dL Final      Lab Results   Component Value Date    WBC 5.8 09/17/2020     Lab Results   Component Value Date    RBC 4.45 09/17/2020     Lab Results   Component Value Date    HGB 14.4 09/17/2020     Lab Results   Component Value Date    HCT 42.2 09/17/2020     No components found for: MCT  Lab Results   Component Value Date    MCV 95 09/17/2020     Lab Results   Component Value Date    MCH 32.4 09/17/2020     Lab Results   Component Value Date    MCHC 34.2 09/17/2020     Lab Results   Component Value Date    RDW 11.1 09/17/2020     Lab Results   Component Value Date     09/17/2020      No results found for: A1C   TSH   Date Value Ref Range Status   09/17/2020 2.06 0.30 - 5.00 uIU/mL Final              No results found for: VITDT                Impression:  No diagnosis found.       10/24/2022 right shin        10/10/22 right shin                 Are any of these wounds new today: No; Location: na    Assessment/Plan:          1. Debridement: After discussion of risk factors and verbal  consent was obtained 2% Lidocaine HCL jelly was applied, under clean conditions, the right shin ulceration(s) were debrided using currette. Devitalized and nonviable tissue, along with any fibrin and slough, was removed to improve granulation tissue formation, stimulate wound healing, decrease overall bacteria load, disrupt biofilm formation and decrease edge senescence.  Total excisional debridement was 0.8 sq cm from the epidermis/dermis area and into the subcutaneous tissue with a depth of 0.2 cm.   Ulcers were improved afterwards and .  Measures were unchanged after debridement.       2.  Wound treatment: wound treatment will include irrigation and dressings to promote autolytic debridement which will include:will stop the honey and go to endoform; cover with mepilex border; change twice per week; pt will do this If for some reason the patient is not able to get their dressing(s) changed as outlined above (due to illness, lack of supplies, lack of help) please do the following: remove old, soiled dressings; wash the wounds with saline; pat dry; apply ABD pad or other absorbant pad and secure with rolled gauze; avoid tape directly on your skin; patient instructed to call the clinic as soon as possible to let us know what the current issues are in receiving wound care. Improved            3. Edema: tubular compression; elevation. The compression wraps were applied today in clinic.     If a 2 layer or 4 layer compression wrap is being used; these are safe to have on for ONLY 7 days. If for some reason the patient is not able to get the wrap(s) changed (due to illness; lack of supplies, lack of help, lack of transportation) please do the following: unwrap the old 2 or 4 layer compression wrap; avoid using scissors as you could cut your skin and cause wounds; use tubular compression when available. Call to reschedule your home care or clinic visit appointment as soon as possible.  Stable            4.  Nutrition: focus on protein; healthy weight           5. Offloading: na     Patient will follow up with me in 3 weeks for reevaluation. They were instructed to call the clinic sooner with any signs or symptoms of infection or any further questions/concerns. Answered all questions.          Maricel Nix DNP, RN, CNP, CWOCN, CFCN, CLT  [facility]   [phone]        This note was electronically signed by Maricel Nix NP

## 2022-10-24 NOTE — LETTER
"[facility]  [address] [place] [address] [address], [place] [address]  [phone]      Fax [phone]    Formerly [facility]           Fax: [phone]            Customer Service: [phone]    Wound Dressing Rx and Order Form  Order Status: re-order  Verbal: Nuria  Date: 2022     Ynes Mart  Gender: female  : 1955  [address] [employer] [address]  [phone] (home)     Medical Record: [id number]  Primary Care Provider: Blanca Mace      ICD-10-CM    1. Traumatic open wound of right lower leg, subsequent encounter  S81.801D DEBRIDE SKIN/SUBQ TISSUE     Wound care            Insurance Info:  INSURER: Payor: MONIQUE/LIZZY / Plan: [employer] / Product Type: Indemnity /   Policy ID#:  [id number]  SECONDARY INSURANCE:  [facility]  Secondary Policy ID#:  [id number]        Physician Info:   Name:  GORDO DUGGAN     Dept Address/Phones:   [address] [place] [address], [address] [address] [phone]  Fax: [phone]    Lymphedema circumferential measurements (in cm):  No flowsheet data found.      Wound info:  VASC Wound Right upper shin (Active)   Pre Size Length 1 10/24/22 1100   Pre Size Width 0.8 10/24/22 1100   Pre Size Depth 0.2 10/24/22 1100   Pre Total Sq cm 0.8 10/24/22 1100   Description slough 10/10/22 1400   Number of days: 14        Drainage: moderate  Thickness:  full  Duration of Need: 30 DAYS  Days Supply: 30 DAYS  Start Date: 10/24/22  Starter Kit: none  Qualifying wound/Debridement: Yes      Dressing Type Brand Size Number of pieces Frequency of change    Primary Silicone bordered foam adhesive mepilex 3\"x3\" 12 2 TIMES PER WEEK and as needed     No substitutions preferred. Call [phone].     Wound Care Instructions    Twice per week, Cleanse your right shin wound(s) with Dilute hibiclens 30cc in 500cc NS.    Pat Dry with non-sterile gauze    Apply Lotion to the intact skin surrounding your wound and other dry skin " "locations. Some good lotions include: Remedy Skin Repair Cream, Sarna, Vanicream or Cetaphil    Primary Dressing: Apply endoform collagen into/onto the wounds; cut to fit to the size of the wound; Please note that endoform will do one of two things; it will either form a hard crust like scab structure or a yellow gelatinous substance. If a crust forms do not try to remove this; just gently cleanse and apply another piece of collagen on top; if a yellow gelatinous substance forms cleanse this away and apply a new piece of collagen.       Secondary dressing: Cover with bordered mepilex 3\"x3\"    Secure with non-sterile roll gauze (4\" x 75\" roll) and tape (1\" roll tape) as needed; avoid adhesive directly on the skin      OK to forward to covered supplier.    Electronically Signed Physician:  GORDO DUGGAN             Date: October 24, 2022    "

## 2022-10-24 NOTE — PATIENT INSTRUCTIONS
"Wound care supplies were ordered today through [employer] and if you are not receiving your supplies or have a question on your bill please contact Lara Reynolds at [phone]. Please allow 2-5 business days for delivery of supplies.         Wound Care Instructions    Twice per week, Cleanse your right shin wound(s) with Dilute hibiclens 30cc in 500cc NS.    Pat Dry with non-sterile gauze    Apply Lotion to the intact skin surrounding your wound and other dry skin locations. Some good lotions include: Remedy Skin Repair Cream, Sarna, Vanicream or Cetaphil    Primary Dressing: Apply endoform collagen into/onto the wounds; cut to fit to the size of the wound; Please note that endoform will do one of two things; it will either form a hard crust like scab structure or a yellow gelatinous substance. If a crust forms do not try to remove this; just gently cleanse and apply another piece of collagen on top; if a yellow gelatinous substance forms cleanse this away and apply a new piece of collagen.       Secondary dressing: Cover with bordered mepilex 4x4    Secure with non-sterile roll gauze (4\" x 75\" roll) and tape (1\" roll tape) as needed; avoid adhesive directly on the skin    Compression: tubular compression from base of toes to just below the knee    It is not ok to get your wound wet in the bath or shower      If for some reason you are not able to get your dressing(s) changed as outlined above (due to illness, lack of supplies, lack of help) please do the following: remove old, soiled dressings; wash the wounds with saline; pat dry; apply ABD pad or other absorbant pad and secure with rolled gauze; avoid tape directly on your skin; Call the clinic as soon as possible to let us know what the current issues are in receiving wound care [phone].      SEEK MEDICAL CARE IF:  You have an increase in swelling, pain, or redness around the wound.  You have an increase in the amount of pus coming from the wound.  There is " a bad smell coming from the wound.  The wound appears to be worsening/enlarging  You have a fever greater than 101.5 F      It is ok to continue current wound care treatment/products for the next 2-3 days until new wound care supplies are ordered and arrive. If longer than this please contact our office at [phone].    If you have a 2 layer or 4 layer compression wrap on these are safe to have on for ONLY 7 days. If for some reason you are not able to get the wrap(s) changed (due to illness; lack of supplies, lack of help, lack of transportation) please do the following: unwrap the old 2 or 4 layer compression wrap; avoid using scissors as you could cut your skin and cause wounds; use tubular compression when available. Call to reschedule your home care or clinic visit appointment as soon as possible.    Please NOTE: if you are 15 minutes late to your clinic appointment you will have to be rescheduled. Please call our clinic as soon as possible if you know you will not be able to get to your appointment at [phone].    If you fail to show up to 3 scheduled clinic appointments you will be dismissed from our clinic.    It is recommended that you do not get your ulcer wet when showering.  Listed below are several ways of keeping it dry when you shower.     1. Wrap it with Press and Seal plastic wrap.  It can be found in the stores where the plastic wraps or tin foil is kept.               2.  Some people take a bath and hang their leg/foot out of the tub.                        3  Put your leg in a plastic bag and tape it on.           4. You can purchase a shower cover for casts at some pharmacies and through the Internet.            5. Take a Bed Bath or wash up at the sink               We want to hear from you!  In the next few weeks, you should receive a call or email to complete a survey about your visit at [facility]. Please help us improve your appointment experience by letting us know  how we did today. We strive to make your experience good and value any ways in which we could do better.      We value your input and suggestions.    Thank you for choosing the [facility]!

## 2022-11-14 ENCOUNTER — OFFICE VISIT (OUTPATIENT)
Dept: VASCULAR SURGERY | Facility: CLINIC | Age: 67
End: 2022-11-14
Attending: NURSE PRACTITIONER
Payer: COMMERCIAL

## 2022-11-14 VITALS
BODY MASS INDEX: 23.57 KG/M2 | RESPIRATION RATE: 16 BRPM | TEMPERATURE: 98.1 F | HEIGHT: 66 IN | OXYGEN SATURATION: 98 % | SYSTOLIC BLOOD PRESSURE: 128 MMHG | DIASTOLIC BLOOD PRESSURE: 82 MMHG | HEART RATE: 68 BPM

## 2022-11-14 DIAGNOSIS — S81.801D TRAUMATIC OPEN WOUND OF RIGHT LOWER LEG, SUBSEQUENT ENCOUNTER: Primary | ICD-10-CM

## 2022-11-14 PROCEDURE — 11042 DBRDMT SUBQ TIS 1ST 20SQCM/<: CPT | Performed by: NURSE PRACTITIONER

## 2022-11-14 PROCEDURE — 97597 DBRDMT OPN WND 1ST 20 CM/<: CPT | Performed by: NURSE PRACTITIONER

## 2022-11-14 ASSESSMENT — PAIN SCALES - GENERAL: PAINLEVEL: NO PAIN (0)

## 2022-11-14 NOTE — PATIENT INSTRUCTIONS
"Wound care supplies were ordered today through [employer] and if you are not receiving your supplies or have a question on your bill please contact Lara Reynolds at [phone]. Please allow 2-5 business days for delivery of supplies.         Wound Care Instructions    Twice per week, Cleanse your right shin wound(s) with Dilute hibiclens 30cc in 500cc NS.    Pat Dry with non-sterile gauze    Apply Lotion to the intact skin surrounding your wound and other dry skin locations. Some good lotions include: Remedy Skin Repair Cream, Sarna, Vanicream or Cetaphil    Primary Dressing: Apply endoform collagen into/onto the wounds; cut to fit to the size of the wound; Please note that endoform will do one of two things; it will either form a hard crust like scab structure or a yellow gelatinous substance. If a crust forms do not try to remove this; just gently cleanse and apply another piece of collagen on top; if a yellow gelatinous substance forms cleanse this away and apply a new piece of collagen.       Secondary dressing: Cover with bordered mepilex 4x4    Secure with non-sterile roll gauze (4\" x 75\" roll) and tape (1\" roll tape) as needed; avoid adhesive directly on the skin    Compression: tubular compression from base of toes to just below the knee    It is not ok to get your wound wet in the bath or shower      If for some reason you are not able to get your dressing(s) changed as outlined above (due to illness, lack of supplies, lack of help) please do the following: remove old, soiled dressings; wash the wounds with saline; pat dry; apply ABD pad or other absorbant pad and secure with rolled gauze; avoid tape directly on your skin; Call the clinic as soon as possible to let us know what the current issues are in receiving wound care [phone].      SEEK MEDICAL CARE IF:  You have an increase in swelling, pain, or redness around the wound.  You have an increase in the amount of pus coming from the wound.  There is " a bad smell coming from the wound.  The wound appears to be worsening/enlarging  You have a fever greater than 101.5 F      It is ok to continue current wound care treatment/products for the next 2-3 days until new wound care supplies are ordered and arrive. If longer than this please contact our office at [phone].    If you have a 2 layer or 4 layer compression wrap on these are safe to have on for ONLY 7 days. If for some reason you are not able to get the wrap(s) changed (due to illness; lack of supplies, lack of help, lack of transportation) please do the following: unwrap the old 2 or 4 layer compression wrap; avoid using scissors as you could cut your skin and cause wounds; use tubular compression when available. Call to reschedule your home care or clinic visit appointment as soon as possible.    Please NOTE: if you are 15 minutes late to your clinic appointment you will have to be rescheduled. Please call our clinic as soon as possible if you know you will not be able to get to your appointment at [phone].    If you fail to show up to 3 scheduled clinic appointments you will be dismissed from our clinic.    It is recommended that you do not get your ulcer wet when showering.  Listed below are several ways of keeping it dry when you shower.     1. Wrap it with Press and Seal plastic wrap.  It can be found in the stores where the plastic wraps or tin foil is kept.               2.  Some people take a bath and hang their leg/foot out of the tub.                        3  Put your leg in a plastic bag and tape it on.           4. You can purchase a shower cover for casts at some pharmacies and through the Internet.            5. Take a Bed Bath or wash up at the sink               We want to hear from you!  In the next few weeks, you should receive a call or email to complete a survey about your visit at [facility]. Please help us improve your appointment experience by letting us know  how we did today. We strive to make your experience good and value any ways in which we could do better.      We value your input and suggestions.    Thank you for choosing the [facility]!

## 2022-11-14 NOTE — PROGRESS NOTES
Follow up Vascular Visit       Date of Service:11/14/22      Chief Complaint: right leg traumatic wound and edema      Pt returns to [facility] with regards to their right leg traumatic wound and edema.  They arrive today alone. They are currently using endoform and bordered foam to the wounds. This is being done by the patient every 3 days. They are using tubular compression for compression; however she arrives with no compression on. They are feeling well today. Denies fevers, chills. No shortness of breath.     Allergies: No Known Allergies    Medications:   Current Outpatient Medications:      ascorbic acid (VITAMIN C) 1000 MG tablet, Take 500 mg by mouth daily , Disp: , Rfl:      calcium carbonate-vitamin D (OS-CONTRERAS WITH D) 500-200 MG-UNIT tablet, Take 1 tablet by mouth, Disp: , Rfl:      citalopram (CELEXA) 20 MG tablet, Take 1 tablet (20 mg) by mouth daily, Disp: 90 tablet, Rfl: 0     fluticasone (FLONASE) 50 mcg/actuation nasal spray, [FLUTICASONE (FLONASE) 50 MCG/ACTUATION NASAL SPRAY] 2 sprays into each nostril daily., Disp: , Rfl:      HYDROcodone-acetaminophen (NORCO) 5-325 MG tablet, Take 1 tablet by mouth, Disp: , Rfl:      Lactobacillus rhamnosus GG (CULTURELLE) 10-15 Billion cell capsule, [LACTOBACILLUS RHAMNOSUS GG (CULTURELLE) 10-15 BILLION CELL CAPSULE] Take 1 capsule by mouth daily., Disp: , Rfl:      magnesium 30 mg tablet, Take 250 mg by mouth daily , Disp: , Rfl:      metoprolol succinate ER (TOPROL-XL) 25 MG 24 hr tablet, Take 1 tablet (25 mg) by mouth 2 times daily, Disp: 180 tablet, Rfl: 3     multivitamin therapeutic (THERAGRAN) tablet, [MULTIVITAMIN THERAPEUTIC (THERAGRAN) TABLET] Take 1 tablet by mouth daily., Disp: , Rfl:      OMEGA-3/DHA/EPA/FISH OIL (FISH OIL-OMEGA-3 FATTY ACIDS) 300-1,000 mg capsule, [OMEGA-3/DHA/EPA/FISH OIL (FISH OIL-OMEGA-3 FATTY ACIDS) 300-1,000 MG CAPSULE] Take 2 g by mouth daily., Disp: , Rfl:      omeprazole 20 mg TbEC, [OMEPRAZOLE  "20 MG TBEC] Take by mouth Daily before breakfast., Disp: , Rfl:      simvastatin (ZOCOR) 20 MG tablet, Take 1 tablet (20 mg) by mouth At Bedtime, Disp: 90 tablet, Rfl: 3     XIIDRA 5 % opthalmic solution, , Disp: , Rfl:     History:   Past Medical History:   Diagnosis Date     Hyperlipidemia      Melanoma (H) 05/2018    Left Arm     SVT (supraventricular tachycardia) (H)        Physical Exam:    /82   Pulse 68   Temp 98.1  F (36.7  C)   Resp 16   Ht 5' 6\" (1.676 m)   SpO2 98%   BMI 23.57 kg/m      General:  Patient presents to clinic in no apparent distress.  Head: normocephalic atraumatic  Psychiatric:  Alert and oriented x3.   Respiratory: unlabored breathing; no cough  Integumentary:  Skin is uniformly warm, dry and pink.    Wound #1 Location: right shin  Size: 0.3L x 0.3W x 0.1depth.  No sinus tract present, Wound base: initially with boggy eschar; this was removed to reveal 100% viable red; wound bed  No undermining present. Wound is full thickness. There is small drainage. Periwound: no denudement, erythema, induration, maceration or warmth.      VASC Wound Right upper shin (Active)   Pre Size Length 1 10/24/22 1100   Pre Size Width 0.8 10/24/22 1100   Pre Size Depth 0.2 10/24/22 1100   Pre Total Sq cm 0.8 10/24/22 1100   Description scabbed 11/14/22 1000   Number of days: 35            Circumferential volume measures:      No flowsheet data found.    Labs:    I personally reviewed the following lab results today and those on care everywhere    CRP   Date Value Ref Range Status   09/17/2020 <0.1 0.0 - 0.8 mg/dL Final      Erythrocyte Sedimentation Rate   Date Value Ref Range Status   09/17/2020 6 0 - 20 mm/hr Final      Last Renal Panel:  Sodium   Date Value Ref Range Status   11/19/2021 141 136 - 145 mmol/L Final     Potassium   Date Value Ref Range Status   11/19/2021 4.2 3.5 - 5.0 mmol/L Final     Chloride   Date Value Ref Range Status   11/19/2021 105 98 - 107 mmol/L Final     Carbon Dioxide " (CO2)   Date Value Ref Range Status   11/19/2021 29 22 - 31 mmol/L Final     Anion Gap   Date Value Ref Range Status   11/19/2021 7 5 - 18 mmol/L Final     Glucose   Date Value Ref Range Status   11/19/2021 88 70 - 125 mg/dL Final     Urea Nitrogen   Date Value Ref Range Status   11/19/2021 10 8 - 22 mg/dL Final     Creatinine   Date Value Ref Range Status   11/19/2021 0.72 0.60 - 1.10 mg/dL Final     GFR Estimate   Date Value Ref Range Status   11/19/2021 86 >60 mL/min/1.73m2 Final     Comment:     As of July 11, 2021, eGFR is calculated by the CKD-EPI creatinine equation, without race adjustment. eGFR can be influenced by muscle mass, exercise, and diet. The reported eGFR is an estimation only and is only applicable if the renal function is stable.   11/18/2020 >60 >60 mL/min/1.73m2 Final     Calcium   Date Value Ref Range Status   11/19/2021 9.8 8.5 - 10.5 mg/dL Final     Albumin   Date Value Ref Range Status   11/19/2021 4.3 3.5 - 5.0 g/dL Final      Lab Results   Component Value Date    WBC 5.8 09/17/2020     Lab Results   Component Value Date    RBC 4.45 09/17/2020     Lab Results   Component Value Date    HGB 14.4 09/17/2020     Lab Results   Component Value Date    HCT 42.2 09/17/2020     No components found for: MCT  Lab Results   Component Value Date    MCV 95 09/17/2020     Lab Results   Component Value Date    MCH 32.4 09/17/2020     Lab Results   Component Value Date    MCHC 34.2 09/17/2020     Lab Results   Component Value Date    RDW 11.1 09/17/2020     Lab Results   Component Value Date     09/17/2020      No results found for: A1C   TSH   Date Value Ref Range Status   09/17/2020 2.06 0.30 - 5.00 uIU/mL Final      No results found for: VITDT                Impression:  Encounter Diagnoses   Name Primary?     Traumatic open wound of right lower leg, subsequent encounter Yes          10/10/22 right shin         11/14/2022 right shin            Are any of these wounds new today: No; Location:  na    Assessment/Plan:          1. Debridement: After discussion of risk factors and verbal consent was obtained 2% Lidocaine HCL jelly was applied, under clean conditions, the right shin ulceration(s) were debrided using currette. Devitalized and nonviable tissue, along with any fibrin and slough, was removed to improve granulation tissue formation, stimulate wound healing, decrease overall bacteria load, disrupt biofilm formation and decrease edge senescence.  Total excisional debridement was 0.09 sq cm from the epidermis/dermis area and into the subcutaneous tissue with a depth of 0.1 cm.   Ulcers were improved afterwards and .  Measures were as noted on the flow sheet.       2.  Wound treatment: wound treatment will include irrigation and dressings to promote autolytic debridement which will include:will continue endoform; mepilex border If for some reason the patient is not able to get their dressing(s) changed as outlined above (due to illness, lack of supplies, lack of help) please do the following: remove old, soiled dressings; wash the wounds with saline; pat dry; apply ABD pad or other absorbant pad and secure with rolled gauze; avoid tape directly on your skin; patient instructed to call the clinic as soon as possible to let us know what the current issues are in receiving wound care. Stable            3. Edema: continue elevation and tubular compression as tolerated. The compression wraps were applied today in clinic.     If a 2 layer or 4 layer compression wrap is being used; these are safe to have on for ONLY 7 days. If for some reason the patient is not able to get the wrap(s) changed (due to illness; lack of supplies, lack of help, lack of transportation) please do the following: unwrap the old 2 or 4 layer compression wrap; avoid using scissors as you could cut your skin and cause wounds; use tubular compression when available. Call to reschedule your home care or clinic visit appointment as  soon as possible.  Improved            4. Nutrition: na           5. Offloading: na     Patient will follow up with me in 2-3 weeks for reevaluation. They were instructed to call the clinic sooner with any signs or symptoms of infection or any further questions/concerns. Answered all questions.          Maricel Nix DNP, RN, CNP, CWOCN, CFCN, CLT  [facility]   [phone]        This note was electronically signed by Maricel Nix, NP

## 2022-11-14 NOTE — Clinical Note
There were no provider charges in this one and it looks like debridement was done. Can you please add? Thanks.

## 2022-11-17 ENCOUNTER — TRANSFERRED RECORDS (OUTPATIENT)
Dept: HEALTH INFORMATION MANAGEMENT | Facility: CLINIC | Age: 67
End: 2022-11-17

## 2022-11-22 ENCOUNTER — OFFICE VISIT (OUTPATIENT)
Dept: FAMILY MEDICINE | Facility: CLINIC | Age: 67
End: 2022-11-22
Payer: COMMERCIAL

## 2022-11-22 VITALS
DIASTOLIC BLOOD PRESSURE: 86 MMHG | TEMPERATURE: 98.4 F | HEIGHT: 66 IN | BODY MASS INDEX: 23.06 KG/M2 | SYSTOLIC BLOOD PRESSURE: 120 MMHG | HEART RATE: 70 BPM | WEIGHT: 143.5 LBS | RESPIRATION RATE: 16 BRPM

## 2022-11-22 DIAGNOSIS — I47.10 SUPRAVENTRICULAR TACHYCARDIA (H): ICD-10-CM

## 2022-11-22 DIAGNOSIS — R05.3 POST-COVID CHRONIC COUGH: ICD-10-CM

## 2022-11-22 DIAGNOSIS — F41.9 ANXIETY: ICD-10-CM

## 2022-11-22 DIAGNOSIS — C43.60 MALIGNANT MELANOMA OF UPPER EXTREMITY, INCLUDING SHOULDER, UNSPECIFIED LATERALITY (H): ICD-10-CM

## 2022-11-22 DIAGNOSIS — E78.49 OTHER HYPERLIPIDEMIA: ICD-10-CM

## 2022-11-22 DIAGNOSIS — R00.2 PALPITATIONS: ICD-10-CM

## 2022-11-22 DIAGNOSIS — Z00.00 ENCOUNTER FOR MEDICARE ANNUAL WELLNESS EXAM: Primary | ICD-10-CM

## 2022-11-22 DIAGNOSIS — U09.9 POST-COVID CHRONIC COUGH: ICD-10-CM

## 2022-11-22 DIAGNOSIS — Z13.1 SCREENING FOR DIABETES MELLITUS: ICD-10-CM

## 2022-11-22 LAB
ALBUMIN SERPL BCG-MCNC: 4.6 G/DL (ref 3.5–5.2)
ALP SERPL-CCNC: 68 U/L (ref 35–104)
ALT SERPL W P-5'-P-CCNC: 24 U/L (ref 10–35)
ANION GAP SERPL CALCULATED.3IONS-SCNC: 11 MMOL/L (ref 7–15)
AST SERPL W P-5'-P-CCNC: 31 U/L (ref 10–35)
BILIRUB SERPL-MCNC: 0.4 MG/DL
BUN SERPL-MCNC: 14.6 MG/DL (ref 8–23)
CALCIUM SERPL-MCNC: 9.2 MG/DL (ref 8.8–10.2)
CHLORIDE SERPL-SCNC: 105 MMOL/L (ref 98–107)
CHOLEST SERPL-MCNC: 212 MG/DL
CREAT SERPL-MCNC: 0.68 MG/DL (ref 0.51–0.95)
DEPRECATED HCO3 PLAS-SCNC: 26 MMOL/L (ref 22–29)
GFR SERPL CREATININE-BSD FRML MDRD: >90 ML/MIN/1.73M2
GLUCOSE SERPL-MCNC: 85 MG/DL (ref 70–99)
HDLC SERPL-MCNC: 77 MG/DL
LDLC SERPL CALC-MCNC: 115 MG/DL
NONHDLC SERPL-MCNC: 135 MG/DL
POTASSIUM SERPL-SCNC: 4.5 MMOL/L (ref 3.4–5.3)
PROT SERPL-MCNC: 6.8 G/DL (ref 6.4–8.3)
SODIUM SERPL-SCNC: 142 MMOL/L (ref 136–145)
TRIGL SERPL-MCNC: 99 MG/DL

## 2022-11-22 PROCEDURE — 80053 COMPREHEN METABOLIC PANEL: CPT | Performed by: NURSE PRACTITIONER

## 2022-11-22 PROCEDURE — 99214 OFFICE O/P EST MOD 30 MIN: CPT | Mod: 25 | Performed by: NURSE PRACTITIONER

## 2022-11-22 PROCEDURE — 80061 LIPID PANEL: CPT | Performed by: NURSE PRACTITIONER

## 2022-11-22 PROCEDURE — 99397 PER PM REEVAL EST PAT 65+ YR: CPT | Performed by: NURSE PRACTITIONER

## 2022-11-22 PROCEDURE — 36415 COLL VENOUS BLD VENIPUNCTURE: CPT | Performed by: NURSE PRACTITIONER

## 2022-11-22 RX ORDER — METOPROLOL SUCCINATE 25 MG/1
25 TABLET, EXTENDED RELEASE ORAL 2 TIMES DAILY
Qty: 180 TABLET | Refills: 3 | Status: SHIPPED | OUTPATIENT
Start: 2022-11-22 | End: 2023-12-20

## 2022-11-22 RX ORDER — CITALOPRAM HYDROBROMIDE 20 MG/1
20 TABLET ORAL DAILY
Qty: 90 TABLET | Refills: 3 | Status: SHIPPED | OUTPATIENT
Start: 2022-11-22 | End: 2023-10-26

## 2022-11-22 RX ORDER — SIMVASTATIN 20 MG
20 TABLET ORAL AT BEDTIME
Qty: 90 TABLET | Refills: 3 | Status: SHIPPED | OUTPATIENT
Start: 2022-11-22 | End: 2022-11-22

## 2022-11-22 RX ORDER — SIMVASTATIN 40 MG
40 TABLET ORAL AT BEDTIME
Qty: 90 TABLET | Refills: 3 | Status: SHIPPED | OUTPATIENT
Start: 2022-11-22 | End: 2023-10-30

## 2022-11-22 ASSESSMENT — ENCOUNTER SYMPTOMS
PARESTHESIAS: 0
NERVOUS/ANXIOUS: 0
BREAST MASS: 0
COUGH: 1
ABDOMINAL PAIN: 0
SORE THROAT: 0
CHILLS: 0
NAUSEA: 0
JOINT SWELLING: 0
FEVER: 0
EYE PAIN: 0
CONSTIPATION: 0
FREQUENCY: 0
PALPITATIONS: 0
SHORTNESS OF BREATH: 0
HEARTBURN: 0
DIZZINESS: 0
HEMATURIA: 0
DIARRHEA: 0
DYSURIA: 0
ARTHRALGIAS: 0
WEAKNESS: 0
MYALGIAS: 0
HEADACHES: 0
HEMATOCHEZIA: 0

## 2022-11-22 ASSESSMENT — ACTIVITIES OF DAILY LIVING (ADL): CURRENT_FUNCTION: NO ASSISTANCE NEEDED

## 2022-11-22 NOTE — PROGRESS NOTES
"SUBJECTIVE:   Ynes is a 67 year old who presents for Preventive Visit.    Patient is  with 2 children and 6 grandchildren.  Previously worked as a .    History of anxiety.  This is really triggered by her mother's passing about 2 years ago.  This was the first time she had experienced much anxiety.  She is doing well with the citalopram.  She still has intermittent anxiety, but she is able to manage this well.    On simvastatin for hyperlipidemia.  She is tolerating this well.    On metoprolol twice daily due to history of palpitations and SVT.  She has not had any recurrent episodes since being on the metoprolol.    Patient had COVID this past June.  Since then, she has had a lingering cough.  Describes the cough as \"deep\" and nonproductive.  Tends to be worse at night or when she is overheated.  Denies any shortness of breath or wheezing.  No history of smoking or asthma.  She is exercising regularly without issue.    Patient has been advised of split billing requirements and indicates understanding: Yes  Are you in the first 12 months of your Medicare coverage?  No    Healthy Habits:     In general, how would you rate your overall health?  Good    Frequency of exercise:  6-7 days/week    Duration of exercise:  30-45 minutes    Do you usually eat at least 4 servings of fruit and vegetables a day, include whole grains    & fiber and avoid regularly eating high fat or \"junk\" foods?  No    Taking medications regularly:  Yes    Medication side effects:  None    Ability to successfully perform activities of daily living:  No assistance needed    Home Safety:  No safety concerns identified    Hearing Impairment:  No hearing concerns    In the past 6 months, have you been bothered by leaking of urine? Yes    In general, how would you rate your overall mental or emotional health?  Good      PHQ-2 Total Score: 0    Additional concerns today:  No      Have you ever done Advance Care Planning? " (For example, a Health Directive, POLST, or a discussion with a medical provider or your loved ones about your wishes): Yes, advance care planning is on file.      Fall risk  Fallen 2 or more times in the past year?: No  Any fall with injury in the past year?: No    Cognitive Screening   1) Repeat 3 items (Leader, Season, Table)    2) Clock draw: NORMAL  3) 3 item recall: Recalls 3 objects  Results: 3 items recalled: COGNITIVE IMPAIRMENT LESS LIKELY    Mini-CogTM Copyright [employer]. Licensed by the author for use in [facility] [employer]; reprinted with permission (rudolph@[facility]). All rights reserved.      Do you have sleep apnea, excessive snoring or daytime drowsiness?: no    Reviewed and updated as needed this visit by clinical staff   Tobacco  Allergies  Meds  Problems  Med Hx  Surg Hx  Fam Hx          Reviewed and updated as needed this visit by Provider   Tobacco  Allergies  Meds  Problems  Med Hx  Surg Hx  Fam Hx         Social History     Tobacco Use     Smoking status: Never     Smokeless tobacco: Never   Substance Use Topics     Alcohol use: Yes     Alcohol/week: 2.0 standard drinks     Comment: Alcoholic Drinks/day: occasional     If you drink alcohol do you typically have >3 drinks per day or >7 drinks per week? No    Alcohol Use 11/22/2022   Prescreen: >3 drinks/day or >7 drinks/week? No   Prescreen: >3 drinks/day or >7 drinks/week? -       Current providers sharing in care for this patient include:   Patient Care Team:  Sil Alegria NP as PCP - General (Family Medicine)  Blanca Mace MD as Assigned PCP  Maricel Nix NP as Assigned Surgical Provider    The following health maintenance items are reviewed in Epic and correct as of today:  Health Maintenance   Topic Date Due     MEDICARE ANNUAL WELLNESS VISIT  11/22/2023     ANNUAL REVIEW OF HM ORDERS  11/22/2023     FALL RISK ASSESSMENT  11/22/2023     MAMMO SCREENING  11/22/2023     LIPID  11/19/2026     ADVANCE CARE PLANNING   "11/22/2027     COLORECTAL CANCER SCREENING  12/04/2028     DTAP/TDAP/TD IMMUNIZATION (4 - Td or Tdap) 11/15/2029     DEXA  04/05/2037     HEPATITIS C SCREENING  Completed     PHQ-2 (once per calendar year)  Completed     INFLUENZA VACCINE  Completed     Pneumococcal Vaccine: 65+ Years  Completed     ZOSTER IMMUNIZATION  Completed     COVID-19 Vaccine  Completed     IPV IMMUNIZATION  Aged Out     MENINGITIS IMMUNIZATION  Aged Out       Breast CA Risk Assessment (FHS-7) 11/19/2021   Do you have a family history of breast, colon, or ovarian cancer? No / Unknown       Pertinent mammograms are reviewed under the imaging tab.    Review of Systems   Constitutional: Negative for chills and fever.   HENT: Negative for congestion, ear pain, hearing loss and sore throat.    Eyes: Negative for pain and visual disturbance.   Respiratory: Positive for cough. Negative for shortness of breath.    Cardiovascular: Negative for chest pain, palpitations and peripheral edema.   Gastrointestinal: Negative for abdominal pain, constipation, diarrhea, heartburn, hematochezia and nausea.   Breasts:  Negative for tenderness, breast mass and discharge.   Genitourinary: Negative for dysuria, frequency, genital sores, hematuria, pelvic pain, urgency, vaginal bleeding and vaginal discharge.   Musculoskeletal: Negative for arthralgias, joint swelling and myalgias.   Skin: Negative for rash.   Neurological: Negative for dizziness, weakness, headaches and paresthesias.   Psychiatric/Behavioral: Negative for mood changes. The patient is not nervous/anxious.        OBJECTIVE:   /86 (BP Location: Right arm, Patient Position: Sitting, Cuff Size: Adult Regular)   Pulse 70   Temp 98.4  F (36.9  C) (Oral)   Resp 16   Ht 1.67 m (5' 5.75\")   Wt 65.1 kg (143 lb 8 oz)   BMI 23.34 kg/m   Estimated body mass index is 23.34 kg/m  as calculated from the following:    Height as of this encounter: 1.67 m (5' 5.75\").    Weight as of this encounter: 65.1 " kg (143 lb 8 oz).  Physical Exam  GENERAL: healthy, alert and no distress  EYES: Eyes grossly normal to inspection, PERRL and conjunctivae and sclerae normal  HENT: ear canals and TM's normal, nose and mouth without ulcers or lesions  NECK: no adenopathy, no asymmetry, masses, or scars and thyroid normal to palpation  RESP: lungs clear to auscultation - no rales, rhonchi or wheezes  CV: regular rate and rhythm, normal S1 S2, no S3 or S4, no murmur, click or rub, no peripheral edema  ABDOMEN: soft, nontender, no hepatosplenomegaly, no masses and bowel sounds normal  MS: no gross musculoskeletal defects noted, no edema  SKIN: no suspicious lesions or rashes  NEURO: Normal strength and tone, mentation intact and speech normal  PSYCH: mentation appears normal, affect normal/bright      ASSESSMENT / PLAN:   Ynes was seen today for annual visit.    Diagnoses and all orders for this visit:    Encounter for Medicare annual wellness exam    Screening for diabetes mellitus  -     Comprehensive metabolic panel (BMP + Alb, Alk Phos, ALT, AST, Total. Bili, TP)    Other hyperlipidemia  -     simvastatin (ZOCOR) 20 MG tablet; Take 1 tablet (20 mg) by mouth At Bedtime  -     Lipid panel reflex to direct LDL Fasting    Anxiety  Stable on Celexa.  -     citalopram (CELEXA) 20 MG tablet; Take 1 tablet (20 mg) by mouth daily    Palpitations  No episodes of palpitations or SVT.  -     metoprolol succinate ER (TOPROL XL) 25 MG 24 hr tablet; Take 1 tablet (25 mg) by mouth 2 times daily    Supraventricular tachycardia (H)  -     metoprolol succinate ER (TOPROL XL) 25 MG 24 hr tablet; Take 1 tablet (25 mg) by mouth 2 times daily    Post-COVID chronic cough  Reassured patient.  Exam is benign and she has no smoking history.  Can consider pulmonology referral if this continues.  Discussed symptoms that would warrant more urgent follow up.     Other orders  -     REVIEW OF HEALTH MAINTENANCE PROTOCOL ORDERS        Patient has been advised of  split billing requirements and indicates understanding: Yes      COUNSELING:  Reviewed preventive health counseling, as reflected in patient instructions        She reports that she has never smoked. She has never used smokeless tobacco.      Appropriate preventive services were discussed with this patient, including applicable screening as appropriate for cardiovascular disease, diabetes, osteopenia/osteoporosis, and glaucoma.  As appropriate for age/gender, discussed screening for colorectal cancer, prostate cancer, breast cancer, and cervical cancer. Checklist reviewing preventive services available has been given to the patient.    Reviewed patients plan of care and provided an AVS. The Basic Care Plan (routine screening as documented in Health Maintenance) for Ynes meets the Care Plan requirement. This Care Plan has been established and reviewed with the Patient.      Sil Alegria NP  [facility]    Identified Health Risks:

## 2022-11-22 NOTE — PATIENT INSTRUCTIONS
Patient Education   Personalized Prevention Plan  You are due for the preventive services outlined below.  Your care team is available to assist you in scheduling these services.  If you have already completed any of these items, please share that information with your care team to update in your medical record.  Health Maintenance Due   Topic Date Due     ANNUAL REVIEW OF HM ORDERS  Never done       Understanding USDA MyPlate  The USDA has guidelines to help you make healthy food choices. These are called MyPlate. MyPlate shows the food groups that make up healthy meals using the image of a place setting. Before you eat, think about the healthiest choices for what to put on your plate or in your cup or bowl. To learn more about building a healthy plate, visit www.choosemyplate.gov.    The food groups    Fruits. Any fruit or 100% fruit juice counts as part of the Fruit Group. Fruits may be fresh, canned, frozen, or dried, and may be whole, cut-up, or pureed. Make 1/2 of your plate fruits and vegetables.    Vegetables. Any vegetable or 100% vegetable juice counts as a member of the Vegetable Group. Vegetables may be fresh, frozen, canned, or dried. They can be served raw or cooked and may be whole, cut-up, or mashed. Make 1/2 of your plate fruits and vegetables.    Grains. All foods made from grains are part of the Grains Group. These include wheat, rice, oats, cornmeal, and barley. Grains are often used to make foods such as bread, pasta, oatmeal, cereal, tortillas, and grits. Grains should be no more than 1/4 of your plate. At least half of your grains should be whole grains.    Protein. This group includes meat, poultry, seafood, beans and peas, eggs, processed soy products (such as tofu), nuts (including nut butters), and seeds. Make protein choices no more than 1/4 of your plate. Meat and poultry choices should be lean or low fat.    Dairy. The Dairy Group includes all fluid milk products and foods made from  milk that contain calcium, such as yogurt and cheese. (Foods that have little calcium, such as cream, butter, and cream cheese, are not part of this group.) Most dairy choices should be low-fat or fat-free.    Oils. Oils aren't a food group, but they do contain essential nutrients. However it's important to watch your intake of oils. These are fats that are liquid at room temperature. They include canola, corn, olive, soybean, vegetable, and sunflower oil. Foods that are mainly oil include mayonnaise, certain salad dressings, and soft margarines. You likely already get your daily oil allowance from the foods you eat.  Things to limit  Eating healthy also means limiting these things in your diet:       Salt (sodium). Many processed foods have a lot of sodium. To keep sodium intake down, eat fresh vegetables, meats, poultry, and seafood when possible. Purchase low-sodium, reduced-sodium, or no-salt-added food products at the store. And don't add salt to your meals at home. Instead, season them with herbs and spices such as dill, oregano, cumin, and paprika. Or try adding flavor with lemon or lime zest and juice.    Saturated fat. Saturated fats are most often found in animal products such as beef, pork, and chicken. They are often solid at room temperature, such as butter. To reduce your saturated fat intake, choose leaner cuts of meat and poultry. And try healthier cooking methods such as grilling, broiling, roasting, or baking. For a simple lower-fat swap, use plain nonfat yogurt instead of mayonnaise when making potato salad or macaroni salad.    Added sugars. These are sugars added to foods. They are in foods such as ice cream, candy, soda, fruit drinks, sports drinks, energy drinks, cookies, pastries, jams, and syrups. Cut down on added sugars by sharing sweet treats with a family member or friend. You can also choose fruit for dessert, and drink water or other unsweetened beverages.     [employer] last reviewed  this educational content on 6/1/2020 2000-2021 [employer]. All rights reserved. This information is not intended as a substitute for professional medical care. Always follow your healthcare professional's instructions.          Urinary Incontinence, Female (Adult)   Urinary incontinence means loss of bladder control. This problem affects many women, especially as they get older. If you have incontinence, you may be embarrassed to ask for help. But know that this problem can be treated.   Types of Incontinence  There are different types of incontinence. Two of the main types are described here. You can have more than one type.     Stress incontinence. With this type, urine leaks when pressure (stress) is put on the bladder. This may happen when you cough, sneeze, or laugh. Stress incontinence most often occurs because the pelvic floor muscles that support the bladder and urethra are weak. This can happen after pregnancy and vaginal childbirth or a hysterectomy. It can also be due to excess body weight or hormone changes.    Urge incontinence (also called overactive bladder). With this type, a sudden urge to urinate is felt often. This may happen even though there may not be much urine in the bladder. The need to urinate often during the night is common. Urge incontinence most often occurs because of bladder spasms. This may be due to bladder irritation or infection. Damage to bladder nerves or pelvic muscles, constipation, and certain medicines can also lead to urge incontinence.  Treatment depends on the cause. Further evaluation is needed to find the type you have. This will likely include an exam and certain tests. Based on the results, you and your healthcare provider can then plan treatment. Until a diagnosis is made, the home care tips below can help ease symptoms.   Home care    Do pelvic floor muscle exercises, if they are prescribed. The pelvic floor muscles help support the bladder and  urethra. Many women find that their symptoms improve when doing special exercises that strengthen these muscles. To do the exercises, contract the muscles you would use to stop your stream of urine. But do this when you re not urinating. Hold for 10 seconds, then relax. Repeat 10 to 20 times in a row, at least 3 times a day. Your healthcare provider may give you other instructions for how to do the exercises and how often.    Keep a bladder diary. This helps track how often and how much you urinate over a set period of time. Bring this diary with you to your next visit with the provider. The information can help your provider learn more about your bladder problem.    Lose weight, if advised to by your provider. Extra weight puts pressure on the bladder. Your provider can help you create a weight-loss plan that s right for you. This may include exercising more and making certain diet changes.    Don't have foods and drinks that may irritate the bladder. These can include alcohol and caffeinated drinks.    Quit smoking. Smoking and other tobacco use can lead to a long-term (chronic) cough that strains the pelvic floor muscles. Smoking may also damage the bladder and urethra. Talk with your provider about treatments or methods you can use to quit smoking.    If drinking large amounts of fluid makes you have symptoms, you may be advised to limit your fluid intake. You may also be advised to drink most of your fluids during the day and to limit fluids at night.    If you re worried about urine leakage or accidents, you may wear absorbent pads to catch urine. Change the pads often. This helps reduce discomfort. It may also reduce the risk of skin or bladder infections.    Follow-up care  Follow up with your healthcare provider, or as directed. It may take some to find the right treatment for your problem. But healthy lifestyle changes can be made right away. These include such things as exercising on a regular basis,  eating a healthy diet, losing weight (if needed), and quitting smoking. Your treatment plan may include special therapies or medicines. Certain procedures or surgery may also be options. Talk about any questions you have with your provider.   When to seek medical advice  Call the healthcare provider right away if any of these occur:    Fever of 100.4 F (38 C) or higher, or as directed by your provider    Bladder pain or fullness    Belly swelling    Nausea or vomiting    Back pain    Weakness, dizziness, or fainting  [employer] last reviewed this educational content on 1/1/2020 2000-2021 [employer]. All rights reserved. This information is not intended as a substitute for professional medical care. Always follow your healthcare professional's instructions.

## 2022-11-25 ENCOUNTER — HOSPITAL ENCOUNTER (OUTPATIENT)
Dept: MAMMOGRAPHY | Facility: CLINIC | Age: 67
Discharge: HOME | End: 2022-11-25
Attending: NURSE PRACTITIONER | Admitting: NURSE PRACTITIONER
Payer: COMMERCIAL

## 2022-11-25 DIAGNOSIS — Z12.31 VISIT FOR SCREENING MAMMOGRAM: ICD-10-CM

## 2022-11-25 PROCEDURE — 77067 SCR MAMMO BI INCL CAD: CPT

## 2023-05-23 ENCOUNTER — TRANSFERRED RECORDS (OUTPATIENT)
Dept: HEALTH INFORMATION MANAGEMENT | Facility: CLINIC | Age: 68
End: 2023-05-23
Payer: COMMERCIAL

## 2023-07-12 ENCOUNTER — OFFICE VISIT (OUTPATIENT)
Dept: FAMILY MEDICINE | Facility: CLINIC | Age: 68
End: 2023-07-12
Payer: COMMERCIAL

## 2023-07-12 VITALS
BODY MASS INDEX: 23.14 KG/M2 | OXYGEN SATURATION: 99 % | DIASTOLIC BLOOD PRESSURE: 86 MMHG | TEMPERATURE: 97.4 F | HEIGHT: 66 IN | WEIGHT: 144 LBS | SYSTOLIC BLOOD PRESSURE: 120 MMHG | HEART RATE: 71 BPM

## 2023-07-12 DIAGNOSIS — Z01.818 PREOP GENERAL PHYSICAL EXAM: Primary | ICD-10-CM

## 2023-07-12 DIAGNOSIS — H26.9 CATARACT OF RIGHT EYE, UNSPECIFIED CATARACT TYPE: ICD-10-CM

## 2023-07-12 DIAGNOSIS — C43.60 MALIGNANT MELANOMA OF UPPER EXTREMITY, INCLUDING SHOULDER, UNSPECIFIED LATERALITY (H): ICD-10-CM

## 2023-07-12 DIAGNOSIS — I47.10 SUPRAVENTRICULAR TACHYCARDIA (H): ICD-10-CM

## 2023-07-12 PROCEDURE — 99213 OFFICE O/P EST LOW 20 MIN: CPT | Performed by: NURSE PRACTITIONER

## 2023-07-12 RX ORDER — KETOROLAC TROMETHAMINE 5 MG/ML
SOLUTION OPHTHALMIC
COMMUNITY
Start: 2023-07-10 | End: 2023-08-18

## 2023-07-12 RX ORDER — MOXIFLOXACIN 5 MG/ML
SOLUTION/ DROPS OPHTHALMIC
COMMUNITY
Start: 2023-07-10 | End: 2023-08-18

## 2023-07-12 RX ORDER — PREDNISOLONE ACETATE 10 MG/ML
SUSPENSION/ DROPS OPHTHALMIC
COMMUNITY
Start: 2023-07-10 | End: 2023-08-18

## 2023-07-12 NOTE — PATIENT INSTRUCTIONS
For informational purposes only. Not to replace the advice of your health care provider. Copyright   2003,  [employer] [facility]. All rights reserved. Clinically reviewed by Aria Arguelles MD. [employer] [id number] - REV .  Preparing for Your Surgery  Getting started  A nurse will call you to review your health history and instructions. They will give you an arrival time based on your scheduled surgery time. Please be ready to share:  Your doctor's clinic name and phone number  Your medical, surgical, and anesthesia history  A list of allergies and sensitivities  A list of medicines, including herbal treatments and over-the-counter drugs  Whether the patient has a legal guardian (ask how to send us the papers in advance)  Please tell us if you're pregnant--or if there's any chance you might be pregnant. Some surgeries may injure a fetus (unborn baby), so they require a pregnancy test. Surgeries that are safe for a fetus don't always need a test, and you can choose whether to have one.   If you have a child who's having surgery, please ask for a copy of Preparing for Your Child's Surgery.    Preparing for surgery  Within 10 to 30 days of surgery: Have a pre-op exam (sometimes called an H&P, or History and Physical). This can be done at a clinic or pre-operative center.  If you're having a , you may not need this exam. Talk to your care team.  At your pre-op exam, talk to your care team about all medicines you take. If you need to stop any medicines before surgery, ask when to start taking them again.  We do this for your safety. Many medicines can make you bleed too much during surgery. Some change how well surgery (anesthesia) drugs work.  Call your insurance company to let them know you're having surgery. (If you don't have insurance, call [phone].)  Call your clinic if there's any change in your health. This includes signs of a cold or flu (sore throat, runny nose, cough, rash, fever). It  also includes a scrape or scratch near the surgery site.  If you have questions on the day of surgery, call your hospital or surgery center.  Eating and drinking guidelines  For your safety: Unless your surgeon tells you otherwise, follow the guidelines below.  Eat and drink as usual until 8 hours before you arrive for surgery. After that, no food or milk.  Drink clear liquids until 2 hours before you arrive. These are liquids you can see through, like water, Gatorade, and Propel Water. They also include plain black coffee and tea (no cream or milk), candy, and breath mints. You can spit out gum when you arrive.  If you drink alcohol: Stop drinking it the night before surgery.  If your care team tells you to take medicine on the morning of surgery, it's okay to take it with a sip of water.  Preventing infection  Shower or bathe the night before and morning of your surgery. Follow the instructions your clinic gave you. (If no instructions, use regular soap.)  Don't shave or clip hair near your surgery site. We'll remove the hair if needed.  Don't smoke or vape the morning of surgery. You may chew nicotine gum up to 2 hours before surgery. A nicotine patch is okay.  Note: Some surgeries require you to completely quit smoking and nicotine. Check with your surgeon.  Your care team will make every effort to keep you safe from infection. We will:  Clean our hands often with soap and water (or an alcohol-based hand rub).  Clean the skin at your surgery site with a special soap that kills germs.  Give you a special gown to keep you warm. (Cold raises the risk of infection.)  Wear special hair covers, masks, gowns and gloves during surgery.  Give antibiotic medicine, if prescribed. Not all surgeries need antibiotics.  What to bring on the day of surgery  Photo ID and insurance card  Copy of your health care directive, if you have one  Glasses and hearing aids (bring cases)  You can't wear contacts during surgery  Inhaler and  eye drops, if you use them (tell us about these when you arrive)  CPAP machine or breathing device, if you use them  A few personal items, if spending the night  If you have . . .  A pacemaker, ICD (cardiac defibrillator) or other implant: Bring the ID card.  An implanted stimulator: Bring the remote control.  A legal guardian: Bring a copy of the certified (court-stamped) guardianship papers.  Please remove any jewelry, including body piercings. Leave jewelry and other valuables at home.  If you're going home the day of surgery  You must have a responsible adult drive you home. They should stay with you overnight as well.  If you don't have someone to stay with you, and you aren't safe to go home alone, we may keep you overnight. Insurance often won't pay for this.  After surgery  If it's hard to control your pain or you need more pain medicine, please call your surgeon's office.  Questions?   If you have any questions for your care team, list them here: _________________________________________________________________________________________________________________________________________________________________________ ____________________________________ ____________________________________ ____________________________________    How to Take Your Medication Before Surgery  - Take your Metoprolol with a small sip of water the morning of surgery

## 2023-07-12 NOTE — PROGRESS NOTES
[facility]  [address] [place] [address]  Phone: [phone]  Fax: [phone]  Primary Provider: Sil Alegria  Pre-op Performing Provider: SIL ALEGRIA      PREOPERATIVE EVALUATION:  Today's date: 7/12/2023    Ynes Mart is a 67 year old female who presents for a preoperative evaluation.    Surgical Information:  Surgery/Procedure: Right Cataract  Surgery Location: [facility]  Surgeon: Dr. Louis  Surgery Date: 07/20/2023  Time of Surgery: 7:30 AM  Where patient plans to recover: At home with family   Fax number for surgical facility: [phone]      Assessment & Plan     The proposed surgical procedure is considered LOW risk.    Preop general physical exam    Cataract of right eye, unspecified cataract type    Supraventricular tachycardia (H)  Controlled on Metoprolol.     Malignant melanoma of upper extremity, including shoulder, unspecified laterality (H)  In remission x 5 years.  Follows with dermatology every 6 months.       Antiplatelet or Anticoagulation Medication Instructions:   - Patient is on no antiplatelet or anticoagulation medications.    Additional Medication Instructions:   - Beta Blockers: Continue taking on the day of surgery.   - SSRIs, SNRIs, TCAs, Antipsychotics: Continue without modification.     RECOMMENDATION:  APPROVAL GIVEN to proceed with proposed procedure, without further diagnostic evaluation.        Subjective       HPI related to upcoming procedure:     Patient scheduled for right cataract surgery.         7/5/2023    10:49 AM   Preop Questions   1. Have you ever had a heart attack or stroke? No   2. Have you ever had surgery on your heart or blood vessels, such as a stent placement, a coronary artery bypass, or surgery on an artery in your head, neck, heart, or legs? No   3. Do you have chest pain with activity? No   4. Do you have a history of  heart failure? No   5. Do you currently have a cold, bronchitis or  symptoms of other infection? No   6. Do you have a cough, shortness of breath, or wheezing? No   7. Do you or anyone in your family have previous history of blood clots? No   8. Do you or does anyone in your family have a serious bleeding problem such as prolonged bleeding following surgeries or cuts? No   9. Have you ever had problems with anemia or been told to take iron pills? No   10. Have you had any abnormal blood loss such as black, tarry or bloody stools, or abnormal vaginal bleeding? No   11. Have you ever had a blood transfusion? No   12. Are you willing to have a blood transfusion if it is medically needed before, during, or after your surgery? Yes   13. Have you or any of your relatives ever had problems with anesthesia? Yes, some nausea   14. Do you have sleep apnea, excessive snoring or daytime drowsiness? No   15. Do you have any artifical heart valves or other implanted medical devices like a pacemaker, defibrillator, or continuous glucose monitor? No   16. Do you have artificial joints? No   17. Are you allergic to latex? No       Health Care Directive:  Patient does not have a Health Care Directive or Living Will: Discussed advance care planning with patient; however, patient declined at this time.    Preoperative Review of :   reviewed - no record of controlled substances prescribed.      Review of Systems  CONSTITUTIONAL: NEGATIVE for fever, chills, change in weight  ENT/MOUTH: NEGATIVE for ear, mouth and throat problems  RESP: NEGATIVE for significant cough or SOB  CV: NEGATIVE for chest pain, palpitations or peripheral edema    Patient Active Problem List    Diagnosis Date Noted     Anxiety 11/22/2022     Priority: Medium     Malignant melanoma of upper extremity, including shoulder (H) 05/07/2018     Priority: Medium     Hyperlipidemia      Priority: Medium     Created by Conversion         Supraventricular tachycardia (H)      Priority: Medium     Created by Conversion         Allergic  Rhinitis      Priority: Medium     Created by Conversion  Replacement Utility updated for latest IMO load         Basal Cell Carcinoma Of The Skin      Priority: Medium     Created by Conversion  [facility] Annotation: Jul 2 2009  9:22EPI - Melinda Huertas: seepatric [place] 8/09 also         Palpitations      Priority: Medium     Created by Conversion         Restless Legs Syndrome      Priority: Medium     Created by Conversion         Esophageal Reflux      Priority: Medium     Created by Conversion          Past Medical History:   Diagnosis Date     Hyperlipidemia      Melanoma (H) 05/2018    Left Arm     SVT (supraventricular tachycardia) (H)      Past Surgical History:   Procedure Laterality Date     HYSTERECTOMY  1995     OOPHORECTOMY  1995     ZZC REMOVAL OF OVARY(S)      Description: Oophorectomy;  Recorded: 09/09/2013;  Comments: cyst on ovary and endometriosis     ZZC TOTAL ABDOM HYSTERECTOMY      Description: Total Abdominal Hysterectomy;  Proc Date: 01/01/1999;  Comments: Dr. Smith at [facility] in [place], Indication was fibroids and endometriosis     Current Outpatient Medications   Medication Sig Dispense Refill     ascorbic acid (VITAMIN C) 1000 MG tablet Take 500 mg by mouth daily        calcium carbonate-vitamin D (OS-CONTRERAS WITH D) 500-200 MG-UNIT tablet Take 1 tablet by mouth       citalopram (CELEXA) 20 MG tablet Take 1 tablet (20 mg) by mouth daily 90 tablet 3     fluticasone (FLONASE) 50 mcg/actuation nasal spray [FLUTICASONE (FLONASE) 50 MCG/ACTUATION NASAL SPRAY] 2 sprays into each nostril daily.       magnesium 30 mg tablet Take 250 mg by mouth daily        metoprolol succinate ER (TOPROL XL) 25 MG 24 hr tablet Take 1 tablet (25 mg) by mouth 2 times daily 180 tablet 3     omeprazole 20 mg TbEC [OMEPRAZOLE 20 MG TBEC] Take by mouth Daily before breakfast.       simvastatin (ZOCOR) 40 MG tablet Take 1 tablet (40 mg) by mouth At Bedtime 90 tablet 3     ketorolac  "(ACULAR) 0.5 % ophthalmic solution  (Patient not taking: Reported on 7/12/2023)       moxifloxacin (VIGAMOX) 0.5 % ophthalmic solution  (Patient not taking: Reported on 7/12/2023)       prednisoLONE acetate (PRED FORTE) 1 % ophthalmic suspension  (Patient not taking: Reported on 7/12/2023)         No Known Allergies     Social History     Tobacco Use     Smoking status: Never     Smokeless tobacco: Never   Substance Use Topics     Alcohol use: Yes     Alcohol/week: 2.0 standard drinks of alcohol     Comment: Alcoholic Drinks/day: occasional     Family History   Problem Relation Age of Onset     Heart Disease Mother         MI @ 94     Hyperlipidemia Mother      Hypertension Mother      Chronic Obstructive Pulmonary Disease Father      Heart Disease Brother 66.00        massive MI     Diabetes Brother         type 2     Diabetes Brother         type 2     Diabetes Sister      Kidney Disease Sister      Heart Disease Sister      Ovarian Cancer Maternal Aunt 80.00     History   Drug Use No         Objective     /86 (BP Location: Right arm, Patient Position: Sitting, Cuff Size: Adult Regular)   Pulse 71   Temp 97.4  F (36.3  C) (Temporal)   Ht 1.67 m (5' 5.75\")   Wt 65.3 kg (144 lb)   SpO2 99%   BMI 23.42 kg/m      Physical Exam  GENERAL APPEARANCE: healthy, alert and no distress  HENT: ear canals and TM's normal and nose and mouth without ulcers or lesions  RESP: lungs clear to auscultation - no rales, rhonchi or wheezes  CV: regular rate and rhythm, normal S1 S2, no S3 or S4 and no murmur, click or rub   ABDOMEN: soft, nontender, no HSM or masses and bowel sounds normal  NEURO: Normal strength and tone, sensory exam grossly normal, mentation intact and speech normal    Recent Labs   Lab Test 11/22/22  0855 11/19/21  0840    141   POTASSIUM 4.5 4.2   CR 0.68 0.72        Diagnostics:  No labs were ordered during this visit.   No EKG required for low risk surgery (cataract, skin procedure, breast " biopsy, etc).    Revised Cardiac Risk Index (RCRI):  The patient has the following serious cardiovascular risks for perioperative complications:   - No serious cardiac risks = 0 points     RCRI Interpretation: 0 points: Class I (very low risk - 0.4% complication rate)           Signed Electronically by: Sil Alegria NP  Copy of this evaluation report is provided to requesting physician.

## 2023-08-18 ENCOUNTER — OFFICE VISIT (OUTPATIENT)
Dept: FAMILY MEDICINE | Facility: CLINIC | Age: 68
End: 2023-08-18
Payer: COMMERCIAL

## 2023-08-18 VITALS
WEIGHT: 143.31 LBS | SYSTOLIC BLOOD PRESSURE: 122 MMHG | OXYGEN SATURATION: 98 % | HEART RATE: 74 BPM | DIASTOLIC BLOOD PRESSURE: 74 MMHG | HEIGHT: 66 IN | TEMPERATURE: 98.7 F | RESPIRATION RATE: 16 BRPM | BODY MASS INDEX: 23.03 KG/M2

## 2023-08-18 DIAGNOSIS — Z01.818 PREOP GENERAL PHYSICAL EXAM: Primary | ICD-10-CM

## 2023-08-18 DIAGNOSIS — I47.10 SUPRAVENTRICULAR TACHYCARDIA (H): ICD-10-CM

## 2023-08-18 DIAGNOSIS — H26.9 CATARACT OF LEFT EYE, UNSPECIFIED CATARACT TYPE: ICD-10-CM

## 2023-08-18 PROCEDURE — 99214 OFFICE O/P EST MOD 30 MIN: CPT | Performed by: NURSE PRACTITIONER

## 2023-08-18 NOTE — PROGRESS NOTES
[facility]  [address] [place] [address]  Phone: [phone]  Fax: [phone]  Primary Provider: Sil Alegria  Pre-op Performing Provider: MARIA M LANDAVERDE      PREOPERATIVE EVALUATION:  Today's date: 8/18/2023    Ynes Mart is a 67 year old female who presents for a preoperative evaluation.      8/18/2023     1:16 PM   Additional Questions   Roomed by JAIMIE PRETTY       Surgical Information:  Surgery/Procedure: cataract   Surgery Location: [facility]   Surgeon: Dr. Louis   Surgery Date: 9/7  Time of Surgery: TBD  Where patient plans to recover: At home with family  Fax number for surgical facility: [phone]     Assessment & Plan     The proposed surgical procedure is considered LOW risk.    1. Preop general physical exam  No concerns on exam today.  Okay to proceed with surgery.    2. Cataract of left eye, unspecified cataract type  Patient having surgery to correct this issue.    3. Supraventricular Tachycardia  Controlled on metoprolol.         - No identified additional risk factors other than previously addressed    Antiplatelet or Anticoagulation Medication Instructions:   - aspirin: Bleeding risk is low for this procedure and daily aspirin may be continued without modification.     Additional Medication Instructions:  Patient is to take all scheduled medications on the day of surgery    RECOMMENDATION:  APPROVAL GIVEN to proceed with proposed procedure, without further diagnostic evaluation.            Subjective       HPI related to upcoming procedure: Patient is having left cataract surgery.  She recently had right cataract surgery after that realize the left eye was not working as well either.  She had vision testing done and it was determined she would benefit from cataract surgery on the left side.  She just had a recent preop prior to the right cataract surgery with no concerns however it is out of the timeframe for preop's.        8/18/2023      1:06 PM   Preop Questions   1. Have you ever had a heart attack or stroke? No   2. Have you ever had surgery on your heart or blood vessels, such as a stent placement, a coronary artery bypass, or surgery on an artery in your head, neck, heart, or legs? No   3. Do you have chest pain with activity? No   4. Do you have a history of  heart failure? No   5. Do you currently have a cold, bronchitis or symptoms of other infection? No   6. Do you have a cough, shortness of breath, or wheezing? No   7. Do you or anyone in your family have previous history of blood clots? No   8. Do you or does anyone in your family have a serious bleeding problem such as prolonged bleeding following surgeries or cuts? No   9. Have you ever had problems with anemia or been told to take iron pills? No   10. Have you had any abnormal blood loss such as black, tarry or bloody stools, or abnormal vaginal bleeding? No   11. Have you ever had a blood transfusion? No   12. Are you willing to have a blood transfusion if it is medically needed before, during, or after your surgery? Yes   13. Have you or any of your relatives ever had problems with anesthesia? No   14. Do you have sleep apnea, excessive snoring or daytime drowsiness? No   15. Do you have any artifical heart valves or other implanted medical devices like a pacemaker, defibrillator, or continuous glucose monitor? No   16. Do you have artificial joints? No   17. Are you allergic to latex? No       Health Care Directive:  Patient does not have a Health Care Directive or Living Will: Patient states has Advance Directive and will bring in a copy to clinic.    Preoperative Review of :   reviewed - no record of controlled substances prescribed.          Review of Systems  CONSTITUTIONAL: NEGATIVE for fever, chills, change in weight  ENT/MOUTH: NEGATIVE for ear, mouth and throat problems  RESP: NEGATIVE for significant cough or SOB  CV: NEGATIVE for chest pain, palpitations or  peripheral edema    Patient Active Problem List    Diagnosis Date Noted    Anxiety 11/22/2022     Priority: Medium    Malignant melanoma of upper extremity, including shoulder (H) 05/07/2018     Priority: Medium    Hyperlipidemia      Priority: Medium     Created by Conversion        Supraventricular tachycardia (H)      Priority: Medium     Created by Conversion        Allergic Rhinitis      Priority: Medium     Created by Conversion  Replacement Utility updated for latest IMO load        Basal Cell Carcinoma Of The Skin      Priority: Medium     Created by Conversion  [facility] Annotation: Jul 2 2009  9:22AM - Melinda Huertas: [facility]        Palpitations      Priority: Medium     Created by Conversion        Restless Legs Syndrome      Priority: Medium     Created by Conversion        Esophageal Reflux      Priority: Medium     Created by Conversion          Past Medical History:   Diagnosis Date    Hyperlipidemia     Melanoma (H) 05/2018    Left Arm    SVT (supraventricular tachycardia) (H)      Past Surgical History:   Procedure Laterality Date    HYSTERECTOMY  1995    OOPHORECTOMY  1995    ZZC REMOVAL OF OVARY(S)      Description: Oophorectomy;  Recorded: 09/09/2013;  Comments: cyst on ovary and endometriosis    ZZC TOTAL ABDOM HYSTERECTOMY      Description: Total Abdominal Hysterectomy;  Proc Date: 01/01/1999;  Comments: Dr. Smith at [facility], Indication was fibroids and endometriosis     Current Outpatient Medications   Medication Sig Dispense Refill    ascorbic acid (VITAMIN C) 1000 MG tablet Take 500 mg by mouth daily       calcium carbonate-vitamin D (OS-CONTRERAS WITH D) 500-200 MG-UNIT tablet Take 1 tablet by mouth      citalopram (CELEXA) 20 MG tablet Take 1 tablet (20 mg) by mouth daily 90 tablet 3    fluticasone (FLONASE) 50 mcg/actuation nasal spray [FLUTICASONE (FLONASE) 50 MCG/ACTUATION NASAL SPRAY] 2 sprays into each nostril daily.       "magnesium 30 mg tablet Take 250 mg by mouth daily       metoprolol succinate ER (TOPROL XL) 25 MG 24 hr tablet Take 1 tablet (25 mg) by mouth 2 times daily 180 tablet 3    omeprazole 20 mg TbEC [OMEPRAZOLE 20 MG TBEC] Take by mouth Daily before breakfast.      simvastatin (ZOCOR) 40 MG tablet Take 1 tablet (40 mg) by mouth At Bedtime 90 tablet 3    ketorolac (ACULAR) 0.5 % ophthalmic solution  (Patient not taking: Reported on 7/12/2023)      moxifloxacin (VIGAMOX) 0.5 % ophthalmic solution  (Patient not taking: Reported on 7/12/2023)      prednisoLONE acetate (PRED FORTE) 1 % ophthalmic suspension  (Patient not taking: Reported on 7/12/2023)         No Known Allergies     Social History     Tobacco Use    Smoking status: Never    Smokeless tobacco: Never   Substance Use Topics    Alcohol use: Yes     Alcohol/week: 2.0 standard drinks of alcohol     Comment: Alcoholic Drinks/day: occasional       History   Drug Use No         Objective     Pulse 74   Temp 98.7  F (37.1  C) (Oral)   Resp 16   Ht 1.676 m (5' 6\")   Wt 65 kg (143 lb 5 oz)   SpO2 98%   BMI 23.13 kg/m      Physical Exam  GENERAL APPEARANCE: healthy, alert and no distress  HENT:nose and mouth without ulcers or lesions  RESP: lungs clear to auscultation - no rales, rhonchi or wheezes  CV: regular rate and rhythm, normal S1 S2, no S3 or S4 and no murmur, click or rub   ABDOMEN: soft, nontender, no HSM or masses and bowel sounds normal  NEURO: Normal strength and tone, sensory exam grossly normal, mentation intact and speech normal    Recent Labs   Lab Test 11/22/22  0855 11/19/21  0840    141   POTASSIUM 4.5 4.2   CR 0.68 0.72        Diagnostics:  No labs were ordered during this visit.   No EKG required for low risk surgery (cataract, skin procedure, breast biopsy, etc).    Revised Cardiac Risk Index (RCRI):  The patient has the following serious cardiovascular risks for perioperative complications:   - No serious cardiac risks = 0 points "     RCRI Interpretation: 0 points: Class I (very low risk - 0.4% complication rate)         Signed Electronically by: ERIC MALLOY CNP  Copy of this evaluation report is provided to requesting physician.    Answers submitted by the patient for this visit:  General Questionnaire (Submitted on 8/18/2023)  Chief Complaint: Chronic problems general questions HPI Form  What is the reason for your visit today? : preop  How many servings of fruits and vegetables do you eat daily?: 2-3  On average, how many sweetened beverages do you drink each day (Examples: soda, juice, sweet tea, etc.  Do NOT count diet or artificially sweetened beverages)?: 1  How many minutes a day do you exercise enough to make your heart beat faster?: 30 to 60  How many days a week do you exercise enough to make your heart beat faster?: 6  How many days per week do you miss taking your medication?: 0

## 2023-10-26 DIAGNOSIS — F41.9 ANXIETY: ICD-10-CM

## 2023-10-26 RX ORDER — CITALOPRAM HYDROBROMIDE 20 MG/1
20 TABLET ORAL DAILY
Qty: 90 TABLET | Refills: 2 | Status: SHIPPED | OUTPATIENT
Start: 2023-10-26 | End: 2024-01-09

## 2023-10-30 DIAGNOSIS — E78.49 OTHER HYPERLIPIDEMIA: ICD-10-CM

## 2023-10-30 RX ORDER — SIMVASTATIN 40 MG
TABLET ORAL
Qty: 90 TABLET | Refills: 2 | Status: SHIPPED | OUTPATIENT
Start: 2023-10-30 | End: 2024-01-09

## 2023-12-12 ENCOUNTER — HOSPITAL ENCOUNTER (OUTPATIENT)
Dept: MAMMOGRAPHY | Facility: CLINIC | Age: 68
Discharge: HOME | End: 2023-12-12
Attending: NURSE PRACTITIONER | Admitting: NURSE PRACTITIONER
Payer: COMMERCIAL

## 2023-12-12 DIAGNOSIS — Z12.31 SCREENING MAMMOGRAM, ENCOUNTER FOR: ICD-10-CM

## 2023-12-12 PROCEDURE — 77067 SCR MAMMO BI INCL CAD: CPT

## 2023-12-20 DIAGNOSIS — I47.10 SUPRAVENTRICULAR TACHYCARDIA (H): ICD-10-CM

## 2023-12-20 DIAGNOSIS — R00.2 PALPITATIONS: ICD-10-CM

## 2023-12-20 RX ORDER — METOPROLOL SUCCINATE 25 MG/1
25 TABLET, EXTENDED RELEASE ORAL 2 TIMES DAILY
Qty: 180 TABLET | Refills: 2 | Status: SHIPPED | OUTPATIENT
Start: 2023-12-20 | End: 2024-01-09

## 2023-12-23 ENCOUNTER — HEALTH MAINTENANCE LETTER (OUTPATIENT)
Age: 68
End: 2023-12-23

## 2024-01-02 ASSESSMENT — ENCOUNTER SYMPTOMS
HEARTBURN: 0
PALPITATIONS: 0
COUGH: 0
HEADACHES: 0
DIZZINESS: 0
SHORTNESS OF BREATH: 0
HEMATURIA: 0
CONSTIPATION: 0
WEAKNESS: 0
BREAST MASS: 0
NERVOUS/ANXIOUS: 0
FREQUENCY: 0
CHILLS: 0
NAUSEA: 0
FEVER: 0
DIARRHEA: 0
EYE PAIN: 0
ARTHRALGIAS: 0
PARESTHESIAS: 0
DYSURIA: 0
MYALGIAS: 0
ABDOMINAL PAIN: 0
SORE THROAT: 0
JOINT SWELLING: 0
HEMATOCHEZIA: 0

## 2024-01-02 ASSESSMENT — ACTIVITIES OF DAILY LIVING (ADL): CURRENT_FUNCTION: NO ASSISTANCE NEEDED

## 2024-01-09 ENCOUNTER — OFFICE VISIT (OUTPATIENT)
Dept: FAMILY MEDICINE | Facility: CLINIC | Age: 69
End: 2024-01-09
Payer: COMMERCIAL

## 2024-01-09 VITALS
HEIGHT: 65 IN | WEIGHT: 145 LBS | DIASTOLIC BLOOD PRESSURE: 76 MMHG | SYSTOLIC BLOOD PRESSURE: 116 MMHG | TEMPERATURE: 97.3 F | HEART RATE: 79 BPM | BODY MASS INDEX: 24.16 KG/M2 | RESPIRATION RATE: 14 BRPM | OXYGEN SATURATION: 99 %

## 2024-01-09 DIAGNOSIS — C43.60 MALIGNANT MELANOMA OF UPPER EXTREMITY, INCLUDING SHOULDER, UNSPECIFIED LATERALITY (H): ICD-10-CM

## 2024-01-09 DIAGNOSIS — F41.9 ANXIETY: ICD-10-CM

## 2024-01-09 DIAGNOSIS — E78.49 OTHER HYPERLIPIDEMIA: ICD-10-CM

## 2024-01-09 DIAGNOSIS — R00.2 PALPITATIONS: ICD-10-CM

## 2024-01-09 DIAGNOSIS — I47.10 SVT (SUPRAVENTRICULAR TACHYCARDIA) (H): ICD-10-CM

## 2024-01-09 DIAGNOSIS — Z00.00 ENCOUNTER FOR MEDICARE ANNUAL WELLNESS EXAM: Primary | ICD-10-CM

## 2024-01-09 LAB
ALBUMIN SERPL BCG-MCNC: 4.5 G/DL (ref 3.5–5.2)
ALP SERPL-CCNC: 65 U/L (ref 40–150)
ALT SERPL W P-5'-P-CCNC: 26 U/L (ref 0–50)
ANION GAP SERPL CALCULATED.3IONS-SCNC: 10 MMOL/L (ref 7–15)
AST SERPL W P-5'-P-CCNC: 35 U/L (ref 0–45)
BILIRUB SERPL-MCNC: 0.3 MG/DL
BUN SERPL-MCNC: 12.5 MG/DL (ref 8–23)
CALCIUM SERPL-MCNC: 9.8 MG/DL (ref 8.8–10.2)
CHLORIDE SERPL-SCNC: 102 MMOL/L (ref 98–107)
CHOLEST SERPL-MCNC: 158 MG/DL
CREAT SERPL-MCNC: 0.67 MG/DL (ref 0.51–0.95)
DEPRECATED HCO3 PLAS-SCNC: 26 MMOL/L (ref 22–29)
EGFRCR SERPLBLD CKD-EPI 2021: >90 ML/MIN/1.73M2
FASTING STATUS PATIENT QL REPORTED: NO
GLUCOSE SERPL-MCNC: 104 MG/DL (ref 70–99)
HDLC SERPL-MCNC: 62 MG/DL
LDLC SERPL CALC-MCNC: 84 MG/DL
NONHDLC SERPL-MCNC: 96 MG/DL
POTASSIUM SERPL-SCNC: 4 MMOL/L (ref 3.4–5.3)
PROT SERPL-MCNC: 6.7 G/DL (ref 6.4–8.3)
SODIUM SERPL-SCNC: 138 MMOL/L (ref 135–145)
TRIGL SERPL-MCNC: 60 MG/DL

## 2024-01-09 PROCEDURE — 99214 OFFICE O/P EST MOD 30 MIN: CPT | Mod: 25 | Performed by: NURSE PRACTITIONER

## 2024-01-09 PROCEDURE — 80061 LIPID PANEL: CPT | Performed by: NURSE PRACTITIONER

## 2024-01-09 PROCEDURE — 80053 COMPREHEN METABOLIC PANEL: CPT | Performed by: NURSE PRACTITIONER

## 2024-01-09 PROCEDURE — 36415 COLL VENOUS BLD VENIPUNCTURE: CPT | Performed by: NURSE PRACTITIONER

## 2024-01-09 PROCEDURE — G0439 PPPS, SUBSEQ VISIT: HCPCS | Performed by: NURSE PRACTITIONER

## 2024-01-09 RX ORDER — METOPROLOL SUCCINATE 25 MG/1
25 TABLET, EXTENDED RELEASE ORAL 2 TIMES DAILY
Qty: 180 TABLET | Refills: 3 | Status: SHIPPED | OUTPATIENT
Start: 2024-01-09

## 2024-01-09 RX ORDER — CITALOPRAM HYDROBROMIDE 20 MG/1
20 TABLET ORAL DAILY
Qty: 90 TABLET | Refills: 3 | Status: SHIPPED | OUTPATIENT
Start: 2024-01-09

## 2024-01-09 RX ORDER — SIMVASTATIN 40 MG
TABLET ORAL
Qty: 90 TABLET | Refills: 3 | Status: SHIPPED | OUTPATIENT
Start: 2024-01-09

## 2024-01-09 ASSESSMENT — ENCOUNTER SYMPTOMS
SORE THROAT: 0
EYE PAIN: 0
ARTHRALGIAS: 0
CHILLS: 0
HEADACHES: 0
PALPITATIONS: 0
PARESTHESIAS: 0
DIZZINESS: 0
DIARRHEA: 0
FEVER: 0
WEAKNESS: 0
SHORTNESS OF BREATH: 0
FREQUENCY: 0
ABDOMINAL PAIN: 0
BREAST MASS: 0
DYSURIA: 0
MYALGIAS: 0
CONSTIPATION: 0
HEARTBURN: 0
NERVOUS/ANXIOUS: 0
HEMATURIA: 0
JOINT SWELLING: 0
COUGH: 0
NAUSEA: 0
HEMATOCHEZIA: 0

## 2024-01-09 ASSESSMENT — ACTIVITIES OF DAILY LIVING (ADL): CURRENT_FUNCTION: NO ASSISTANCE NEEDED

## 2024-01-09 ASSESSMENT — ANXIETY QUESTIONNAIRES
1. FEELING NERVOUS, ANXIOUS, OR ON EDGE: NOT AT ALL
3. WORRYING TOO MUCH ABOUT DIFFERENT THINGS: NOT AT ALL
5. BEING SO RESTLESS THAT IT IS HARD TO SIT STILL: NOT AT ALL
2. NOT BEING ABLE TO STOP OR CONTROL WORRYING: NOT AT ALL
GAD7 TOTAL SCORE: 0
7. FEELING AFRAID AS IF SOMETHING AWFUL MIGHT HAPPEN: NOT AT ALL
IF YOU CHECKED OFF ANY PROBLEMS ON THIS QUESTIONNAIRE, HOW DIFFICULT HAVE THESE PROBLEMS MADE IT FOR YOU TO DO YOUR WORK, TAKE CARE OF THINGS AT HOME, OR GET ALONG WITH OTHER PEOPLE: NOT DIFFICULT AT ALL
GAD7 TOTAL SCORE: 0
6. BECOMING EASILY ANNOYED OR IRRITABLE: NOT AT ALL

## 2024-01-09 ASSESSMENT — PATIENT HEALTH QUESTIONNAIRE - PHQ9
5. POOR APPETITE OR OVEREATING: NOT AT ALL
SUM OF ALL RESPONSES TO PHQ QUESTIONS 1-9: 0

## 2024-01-09 NOTE — PATIENT INSTRUCTIONS
Patient Education   Personalized Prevention Plan  You are due for the preventive services outlined below.  Your care team is available to assist you in scheduling these services.  If you have already completed any of these items, please share that information with your care team to update in your medical record.  Health Maintenance Due   Topic Date Due     RSV VACCINE (Pregnancy & 60+) (1 - 1-dose 60+ series) Never done     Flu Vaccine (1) 09/01/2023     COVID-19 Vaccine (7 - 2023-24 season) 09/01/2023     ANNUAL REVIEW OF HM ORDERS  11/22/2023

## 2024-01-09 NOTE — PROGRESS NOTES
"SUBJECTIVE:   Ynes is a 68 year old, presenting for the following:  Annual Visit (Nonfasting )    Patient turned 68 this last year.  Has been surprised her with a family trip to [place].    On citalopram for anxiety.  She has some occasional episodes, but this is generally very well-controlled.    On metoprolol due to palpitations and history of SVT.  She has not had any episodes of SVT since being on metoprolol.    On a statin and tolerating well.    Patient has no questions or concerns today.    Are you in the first 12 months of your Medicare coverage?  No    Healthy Habits:     In general, how would you rate your overall health?  Good    Frequency of exercise:  6-7 days/week    Duration of exercise:  45-60 minutes    Do you usually eat at least 4 servings of fruit and vegetables a day, include whole grains    & fiber and avoid regularly eating high fat or \"junk\" foods?  No    Taking medications regularly:  Yes    Medication side effects:  None    Ability to successfully perform activities of daily living:  No assistance needed    Home Safety:  No safety concerns identified    Hearing Impairment:  No hearing concerns    In the past 6 months, have you been bothered by leaking of urine?  No    In general, how would you rate your overall mental or emotional health?  Excellent    Additional concerns today:  No      Today's PHQ-2 Score:       1/8/2024     4:37 PM   PHQ-2 ( 1999 [employer])   Q1: Little interest or pleasure in doing things 0   Q2: Feeling down, depressed or hopeless 0   PHQ-2 Score 0   Q1: Little interest or pleasure in doing things Not at all   Q2: Feeling down, depressed or hopeless Not at all   PHQ-2 Score 0           Have you ever done Advance Care Planning? (For example, a Health Directive, POLST, or a discussion with a medical provider or your loved ones about your wishes): Yes, advance care planning is on file.       Fall risk  Fallen 2 or more times in the past year?: No  Any fall with injury in " the past year?: No    Cognitive Screening   1) Repeat 3 items (Leader, Season, Table)    2) Clock draw: NORMAL  3) 3 item recall: Recalls 3 objects  Results: 3 items recalled: COGNITIVE IMPAIRMENT LESS LIKELY    Mini-CogTM Copyright JULIETH Rosenthal. Licensed by the author for use in [facility]; reprinted with permission ([email or web address]). All rights reserved.      Do you have sleep apnea, excessive snoring or daytime drowsiness? : no    Reviewed and updated as needed this visit by clinical staff   Tobacco  Allergies  Meds  Problems  Med Hx  Surg Hx  Fam Hx          Reviewed and updated as needed this visit by Provider   Tobacco  Allergies  Meds  Problems  Med Hx  Surg Hx  Fam Hx         Social History     Tobacco Use    Smoking status: Never    Smokeless tobacco: Never   Substance Use Topics    Alcohol use: Yes     Alcohol/week: 2.0 standard drinks of alcohol     Comment: Alcoholic Drinks/day: occasional           1/2/2024     3:48 PM   Alcohol Use   Prescreen: >3 drinks/day or >7 drinks/week? No       Do you have a current opioid prescription? No  Do you use any other controlled substances or medications that are not prescribed by a provider? None      Current providers sharing in care for this patient include:   Patient Care Team:  Sil Alegria NP as PCP - General (Family Medicine)  Maricel Nix NP as Assigned Surgical Provider  Sil Alegria NP as Assigned PCP    The following health maintenance items are reviewed in Epic and correct as of today:  Health Maintenance   Topic Date Due    INFLUENZA VACCINE (1) 09/01/2023    MEDICARE ANNUAL WELLNESS VISIT  01/09/2025    ANNUAL REVIEW OF HM ORDERS  01/09/2025    FALL RISK ASSESSMENT  01/09/2025    MAMMO SCREENING  12/12/2025    LIPID  11/22/2027    COLORECTAL CANCER SCREENING  10/24/2028    ADVANCE CARE PLANNING  01/09/2029    DTAP/TDAP/TD IMMUNIZATION (5 - Td or Tdap) 11/15/2029    DEXA  04/05/2037    HEPATITIS C SCREENING  Completed    PHQ-2  "(once per calendar year)  Completed    Pneumococcal Vaccine: 65+ Years  Completed    ZOSTER IMMUNIZATION  Completed    RSV VACCINE (Pregnancy & 60+)  Completed    COVID-19 Vaccine  Completed    IPV IMMUNIZATION  Aged Out    HPV IMMUNIZATION  Aged Out    MENINGITIS IMMUNIZATION  Aged Out    RSV MONOCLONAL ANTIBODY  Aged Out         Review of Systems   Constitutional:  Negative for chills and fever.   HENT:  Negative for congestion, ear pain, hearing loss and sore throat.    Eyes:  Negative for pain and visual disturbance.   Respiratory:  Negative for cough and shortness of breath.    Cardiovascular:  Negative for chest pain, palpitations and peripheral edema.   Gastrointestinal:  Negative for abdominal pain, constipation, diarrhea, heartburn, hematochezia and nausea.   Breasts:  Negative for tenderness, breast mass and discharge.   Genitourinary:  Negative for dysuria, frequency, genital sores, hematuria, pelvic pain, urgency, vaginal bleeding and vaginal discharge.   Musculoskeletal:  Negative for arthralgias, joint swelling and myalgias.   Skin:  Negative for rash.   Neurological:  Negative for dizziness, weakness, headaches and paresthesias.   Psychiatric/Behavioral:  Negative for mood changes. The patient is not nervous/anxious.          OBJECTIVE:   /76 (BP Location: Right arm, Patient Position: Sitting, Cuff Size: Adult Regular)   Pulse 79   Temp 97.3  F (36.3  C) (Temporal)   Resp 14   Ht 1.651 m (5' 5\")   Wt 65.8 kg (145 lb)   SpO2 99%   BMI 24.13 kg/m   Estimated body mass index is 24.13 kg/m  as calculated from the following:    Height as of this encounter: 1.651 m (5' 5\").    Weight as of this encounter: 65.8 kg (145 lb).  Physical Exam  GENERAL: healthy, alert and no distress  EYES: Eyes grossly normal to inspection, PERRL and conjunctivae and sclerae normal  HENT: ear canals and TM's normal, nose and mouth without ulcers or lesions  NECK: no adenopathy, no asymmetry, masses, or scars and " thyroid normal to palpation  RESP: lungs clear to auscultation - no rales, rhonchi or wheezes  CV: regular rate and rhythm, normal S1 S2, no S3 or S4, no murmur, click or rub, no peripheral edema  ABDOMEN: soft, nontender, no hepatosplenomegaly, no masses and bowel sounds normal  MS: no gross musculoskeletal defects noted, no edema  SKIN: no suspicious lesions or rashes  NEURO: Normal strength and tone, mentation intact and speech normal  PSYCH: mentation appears normal, affect normal/bright        ASSESSMENT / PLAN:   Ynes was seen today for annual visit.    Diagnoses and all orders for this visit:    Encounter for Medicare annual wellness exam    Other hyperlipidemia  -     simvastatin (ZOCOR) 40 MG tablet; TAKE 1 TABLET AT BEDTIME  -     Comprehensive metabolic panel (BMP + Alb, Alk Phos, ALT, AST, Total. Bili, TP); Future  -     Lipid panel reflex to direct LDL Non-fasting; Future    Palpitations  -     metoprolol succinate ER (TOPROL XL) 25 MG 24 hr tablet; Take 1 tablet (25 mg) by mouth 2 times daily    Anxiety  Stable/well controlled.   -     citalopram (CELEXA) 20 MG tablet; Take 1 tablet (20 mg) by mouth daily    Malignant melanoma of upper extremity, including shoulder, unspecified laterality (H)  Follows with dermatology for surveillance.     SVT (supraventricular tachycardia)  No episodes while on Metoprolol.   -     metoprolol succinate ER (TOPROL XL) 25 MG 24 hr tablet; Take 1 tablet (25 mg) by mouth 2 times daily    Other orders  -     PRIMARY CARE FOLLOW-UP SCHEDULING; Future  -     REVIEW OF HEALTH MAINTENANCE PROTOCOL ORDERS        Patient has been advised of split billing requirements and indicates understanding: Yes      COUNSELING:  Reviewed preventive health counseling, as reflected in patient instructions        She reports that she has never smoked. She has never used smokeless tobacco.      Appropriate preventive services were discussed with this patient, including applicable screening as  appropriate for fall prevention, nutrition, physical activity, Tobacco-use cessation, weight loss and cognition.  Checklist reviewing preventive services available has been given to the patient.    Reviewed patients plan of care and provided an AVS. The Basic Care Plan (routine screening as documented in Health Maintenance) for Ynes meets the Care Plan requirement. This Care Plan has been established and reviewed with the Patient.        Sil Alegria NP  [facility]    Identified Health Risks:  I have reviewed Opioid Use Disorder and Substance Use Disorder risk factors and made any needed referrals.

## 2024-01-23 ENCOUNTER — TRANSFERRED RECORDS (OUTPATIENT)
Dept: HEALTH INFORMATION MANAGEMENT | Facility: CLINIC | Age: 69
End: 2024-01-23
Payer: COMMERCIAL

## 2024-07-25 ENCOUNTER — TRANSFERRED RECORDS (OUTPATIENT)
Dept: HEALTH INFORMATION MANAGEMENT | Facility: CLINIC | Age: 69
End: 2024-07-25
Payer: COMMERCIAL

## 2024-12-13 ENCOUNTER — HOSPITAL ENCOUNTER (OUTPATIENT)
Dept: MAMMOGRAPHY | Facility: CLINIC | Age: 69
Discharge: HOME | End: 2024-12-13
Attending: NURSE PRACTITIONER | Admitting: NURSE PRACTITIONER
Payer: COMMERCIAL

## 2024-12-13 DIAGNOSIS — Z12.31 VISIT FOR SCREENING MAMMOGRAM: ICD-10-CM

## 2024-12-13 PROCEDURE — 77067 SCR MAMMO BI INCL CAD: CPT

## 2024-12-13 PROCEDURE — 77063 BREAST TOMOSYNTHESIS BI: CPT

## 2025-01-21 ENCOUNTER — TRANSFERRED RECORDS (OUTPATIENT)
Dept: HEALTH INFORMATION MANAGEMENT | Facility: CLINIC | Age: 70
End: 2025-01-21
Payer: OTHER GOVERNMENT

## 2025-01-28 SDOH — HEALTH STABILITY: PHYSICAL HEALTH: ON AVERAGE, HOW MANY MINUTES DO YOU ENGAGE IN EXERCISE AT THIS LEVEL?: 40 MIN

## 2025-01-28 SDOH — HEALTH STABILITY: PHYSICAL HEALTH: ON AVERAGE, HOW MANY DAYS PER WEEK DO YOU ENGAGE IN MODERATE TO STRENUOUS EXERCISE (LIKE A BRISK WALK)?: 6 DAYS

## 2025-01-28 ASSESSMENT — SOCIAL DETERMINANTS OF HEALTH (SDOH): HOW OFTEN DO YOU GET TOGETHER WITH FRIENDS OR RELATIVES?: ONCE A WEEK

## 2025-01-29 ENCOUNTER — OFFICE VISIT (OUTPATIENT)
Dept: FAMILY MEDICINE | Facility: CLINIC | Age: 70
End: 2025-01-29
Payer: COMMERCIAL

## 2025-01-29 VITALS
WEIGHT: 144 LBS | RESPIRATION RATE: 16 BRPM | HEIGHT: 65 IN | HEART RATE: 70 BPM | BODY MASS INDEX: 23.99 KG/M2 | SYSTOLIC BLOOD PRESSURE: 132 MMHG | OXYGEN SATURATION: 98 % | TEMPERATURE: 97.3 F | DIASTOLIC BLOOD PRESSURE: 62 MMHG

## 2025-01-29 DIAGNOSIS — F41.9 ANXIETY: ICD-10-CM

## 2025-01-29 DIAGNOSIS — E78.49 OTHER HYPERLIPIDEMIA: ICD-10-CM

## 2025-01-29 DIAGNOSIS — Z78.0 POSTMENOPAUSAL STATUS: ICD-10-CM

## 2025-01-29 DIAGNOSIS — Z13.1 SCREENING FOR DIABETES MELLITUS: ICD-10-CM

## 2025-01-29 DIAGNOSIS — R00.2 PALPITATIONS: ICD-10-CM

## 2025-01-29 DIAGNOSIS — N39.3 FEMALE STRESS INCONTINENCE: ICD-10-CM

## 2025-01-29 DIAGNOSIS — Z00.00 ENCOUNTER FOR MEDICARE ANNUAL WELLNESS EXAM: Primary | ICD-10-CM

## 2025-01-29 DIAGNOSIS — C43.60 MALIGNANT MELANOMA OF UPPER EXTREMITY, INCLUDING SHOULDER, UNSPECIFIED LATERALITY (H): ICD-10-CM

## 2025-01-29 DIAGNOSIS — I47.10 SVT (SUPRAVENTRICULAR TACHYCARDIA): ICD-10-CM

## 2025-01-29 LAB
ALBUMIN SERPL BCG-MCNC: 4.6 G/DL (ref 3.5–5.2)
ALP SERPL-CCNC: 74 U/L (ref 40–150)
ALT SERPL W P-5'-P-CCNC: 25 U/L (ref 0–50)
ANION GAP SERPL CALCULATED.3IONS-SCNC: 8 MMOL/L (ref 7–15)
AST SERPL W P-5'-P-CCNC: 37 U/L (ref 0–45)
BILIRUB SERPL-MCNC: 0.4 MG/DL
BUN SERPL-MCNC: 13.4 MG/DL (ref 8–23)
CALCIUM SERPL-MCNC: 10.7 MG/DL (ref 8.8–10.4)
CHLORIDE SERPL-SCNC: 102 MMOL/L (ref 98–107)
CHOLEST SERPL-MCNC: 175 MG/DL
CREAT SERPL-MCNC: 0.71 MG/DL (ref 0.51–0.95)
EGFRCR SERPLBLD CKD-EPI 2021: 90 ML/MIN/1.73M2
EST. AVERAGE GLUCOSE BLD GHB EST-MCNC: 120 MG/DL
FASTING STATUS PATIENT QL REPORTED: NO
FASTING STATUS PATIENT QL REPORTED: NO
GLUCOSE SERPL-MCNC: 96 MG/DL (ref 70–99)
HBA1C MFR BLD: 5.8 % (ref 0–5.6)
HCO3 SERPL-SCNC: 28 MMOL/L (ref 22–29)
HDLC SERPL-MCNC: 73 MG/DL
LDLC SERPL CALC-MCNC: 77 MG/DL
NONHDLC SERPL-MCNC: 102 MG/DL
POTASSIUM SERPL-SCNC: 4 MMOL/L (ref 3.4–5.3)
PROT SERPL-MCNC: 6.9 G/DL (ref 6.4–8.3)
SODIUM SERPL-SCNC: 138 MMOL/L (ref 135–145)
TRIGL SERPL-MCNC: 126 MG/DL

## 2025-01-29 PROCEDURE — 80061 LIPID PANEL: CPT | Performed by: NURSE PRACTITIONER

## 2025-01-29 PROCEDURE — 80053 COMPREHEN METABOLIC PANEL: CPT | Performed by: NURSE PRACTITIONER

## 2025-01-29 PROCEDURE — 83036 HEMOGLOBIN GLYCOSYLATED A1C: CPT | Performed by: NURSE PRACTITIONER

## 2025-01-29 PROCEDURE — 36415 COLL VENOUS BLD VENIPUNCTURE: CPT | Performed by: NURSE PRACTITIONER

## 2025-01-29 RX ORDER — CITALOPRAM HYDROBROMIDE 20 MG/1
20 TABLET ORAL DAILY
Qty: 90 TABLET | Refills: 3 | Status: SHIPPED | OUTPATIENT
Start: 2025-01-29

## 2025-01-29 RX ORDER — SIMVASTATIN 40 MG
TABLET ORAL
Qty: 90 TABLET | Refills: 3 | Status: SHIPPED | OUTPATIENT
Start: 2025-01-29

## 2025-01-29 RX ORDER — METOPROLOL SUCCINATE 25 MG/1
25 TABLET, EXTENDED RELEASE ORAL 2 TIMES DAILY
Qty: 180 TABLET | Refills: 3 | Status: SHIPPED | OUTPATIENT
Start: 2025-01-29

## 2025-01-29 NOTE — PROGRESS NOTES
Preventive Care Visit  [facility]  Sil Alegria NP, Family Medicine  Jan 29, 2025      Assessment & Plan     Encounter for Medicare annual wellness exam    Other hyperlipidemia  Tolerating statin well.  - Lipid panel reflex to direct LDL Non-fasting  - simvastatin (ZOCOR) 40 MG tablet  Dispense: 90 tablet; Refill: 3    Palpitations  Does not experience frequent palpitations.  - metoprolol succinate ER (TOPROL XL) 25 MG 24 hr tablet  Dispense: 180 tablet; Refill: 3    SVT (supraventricular tachycardia)  No episodes of SVT that she is aware of.   - metoprolol succinate ER (TOPROL XL) 25 MG 24 hr tablet  Dispense: 180 tablet; Refill: 3    Anxiety  Stable.  - citalopram (CELEXA) 20 MG tablet  Dispense: 90 tablet; Refill: 3    Malignant melanoma of upper extremity, including shoulder, unspecified laterality (H)  Follows with dermatology regularly.  Recently found to have BCC and will undergo a MOHS procedure.    Postmenopausal status  - DX Bone Density    Screening for diabetes mellitus  - Comprehensive metabolic panel (BMP + Alb, Alk Phos, ALT, AST, Total. Bili, TP)  - Hemoglobin A1c    Female stress incontinence  Consider pelvic floor therapy.    The longitudinal plan of care for the diagnosis(es)/condition(s) as documented were addressed during this visit. Due to the added complexity in care, I will continue to support Ynes in the subsequent management and with ongoing continuity of care.          Counseling  Appropriate preventive services were addressed with this patient via screening, questionnaire, or discussion as appropriate for fall prevention, nutrition, physical activity, Tobacco-use cessation, social engagement, weight loss and cognition.  Checklist reviewing preventive services available has been given to the patient.  Reviewed patient's diet, addressing concerns and/or questions.   Patient reported safety concerns were addressed today.Information on urinary incontinence and  treatment options given to patient.         Lorie Quick is a 69 year old, presenting for the following:  Annual Visit (Nonfasting )    Will be going to [place] for 6 weeks.    Thinks she may have some plantar fasciitis.     Health Care Directive  Patient does not have a Health Care Directive: Patient states has Advance Directive and will bring in a copy to clinic.      1/28/2025   General Health   How would you rate your overall physical health? Excellent   Feel stress (tense, anxious, or unable to sleep) Only a little   (!) STRESS CONCERN      1/28/2025   Nutrition   Diet: Regular (no restrictions)         1/28/2025   Exercise   Days per week of moderate/strenous exercise 6 days   Average minutes spent exercising at this level 40 min         1/28/2025   Social Factors   Frequency of gathering with friends or relatives Once a week   Worry food won't last until get money to buy more No   Food not last or not have enough money for food? No   Do you have housing? (Housing is defined as stable permanent housing and does not include staying ouside in a car, in a tent, in an abandoned building, in an overnight shelter, or couch-surfing.) Yes   Are you worried about losing your housing? No   Lack of transportation? No   Unable to get utilities (heat,electricity)? No         1/28/2025   Fall Risk   Fallen 2 or more times in the past year? No    No   Trouble with walking or balance? No    No       Multiple values from one day are sorted in reverse-chronological order          1/28/2025   Activities of Daily Living- Home Safety   Needs help with the following daily activites None of the above   Safety concerns in the home No grab bars in the bathroom         1/28/2025   Dental   Dentist two times every year? Yes         1/28/2025   Hearing Screening   Hearing concerns? None of the above         1/28/2025   Driving Risk Screening   Patient/family members have concerns about driving No         1/28/2025   General  Alertness/Fatigue Screening   Have you been more tired than usual lately? No         1/28/2025   Urinary Incontinence Screening   Bothered by leaking urine in past 6 months Yes         1/28/2025   TB Screening   Were you born outside of the [place]? No         Today's PHQ-2 Score:       1/28/2025     1:31 PM   PHQ-2 ( 1999 Pfizer)   Q1: Little interest or pleasure in doing things 0   Q2: Feeling down, depressed or hopeless 0   PHQ-2 Score 0    Q1: Little interest or pleasure in doing things Not at all   Q2: Feeling down, depressed or hopeless Not at all   PHQ-2 Score 0       Patient-reported           1/28/2025   Substance Use   Alcohol more than 3/day or more than 7/wk No   Do you have a current opioid prescription? No   How severe/bad is pain from 1 to 10? 0/10 (No Pain)   Do you use any other substances recreationally? No     Social History     Tobacco Use    Smoking status: Never    Smokeless tobacco: Never   Substance Use Topics    Alcohol use: Yes     Alcohol/week: 2.0 standard drinks of alcohol     Comment: Alcoholic Drinks/day: occasional    Drug use: No           12/12/2023   LAST FHS-7 RESULTS   1st degree relative breast or ovarian cancer No   Any relative bilateral breast cancer No   Any male have breast cancer No   Any ONE woman have BOTH breast AND ovarian cancer No   Any woman with breast cancer before 50yrs No   2 or more relatives with breast AND/OR ovarian cancer No   2 or more relatives with breast AND/OR bowel cancer No            ASCVD Risk   The 10-year ASCVD risk score (Gurinder HALE, et al., 2019) is: 10.3%    Values used to calculate the score:      Age: 69 years      Sex: Female      Is Non- : No      Diabetic: No      Tobacco smoker: No      Systolic Blood Pressure: 132 mmHg      Is BP treated: No      HDL Cholesterol: 62 mg/dL      Total Cholesterol: 158 mg/dL            Reviewed and updated as needed this visit by Provider   Tobacco  Allergies  Meds  Problems   "Med Hx  Surg Hx  Fam Hx              Current providers sharing in care for this patient include:  Patient Care Team:  Sil Alegria NP as PCP - General (Family Medicine)  Sil Alegria NP as Assigned PCP    The following health maintenance items are reviewed in Epic and correct as of today:  Health Maintenance   Topic Date Due    LIPID  01/09/2025    MEDICARE ANNUAL WELLNESS VISIT  01/29/2026    ANNUAL REVIEW OF HM ORDERS  01/29/2026    FALL RISK ASSESSMENT  01/29/2026    MAMMO SCREENING  12/13/2026    GLUCOSE  01/09/2027    COLORECTAL CANCER SCREENING  10/24/2028    DTAP/TDAP/TD IMMUNIZATION (5 - Td or Tdap) 11/15/2029    ADVANCE CARE PLANNING  01/29/2030    DEXA  04/05/2037    HEPATITIS C SCREENING  Completed    PHQ-2 (once per calendar year)  Completed    INFLUENZA VACCINE  Completed    Pneumococcal Vaccine: 50+ Years  Completed    ZOSTER IMMUNIZATION  Completed    RSV VACCINE  Completed    COVID-19 Vaccine  Completed    HPV IMMUNIZATION  Aged Out    MENINGITIS IMMUNIZATION  Aged Out    RSV MONOCLONAL ANTIBODY  Aged Out            Objective    Exam  /62 (BP Location: Right arm, Patient Position: Sitting, Cuff Size: Adult Regular)   Pulse 70   Temp 97.3  F (36.3  C) (Temporal)   Resp 16   Ht 1.651 m (5' 5\")   Wt 65.3 kg (144 lb)   SpO2 98%   BMI 23.96 kg/m     Estimated body mass index is 23.96 kg/m  as calculated from the following:    Height as of this encounter: 1.651 m (5' 5\").    Weight as of this encounter: 65.3 kg (144 lb).    Physical Exam  GENERAL: alert and no distress  EYES: Eyes grossly normal to inspection, PERRL and conjunctivae and sclerae normal  HENT: ear canals and TM's normal, nose and mouth without ulcers or lesions  NECK: no adenopathy, no asymmetry, masses, or scars  RESP: lungs clear to auscultation - no rales, rhonchi or wheezes  CV: regular rate and rhythm, normal S1 S2, no S3 or S4, no murmur, click or rub, no peripheral edema  ABDOMEN: soft, nontender, no " hepatosplenomegaly, no masses and bowel sounds normal  MS: no gross musculoskeletal defects noted, no edema  SKIN: no suspicious lesions or rashes  NEURO: Normal strength and tone, mentation intact and speech normal  PSYCH: mentation appears normal, affect normal/bright         1/29/2025   Mini Cog   Clock Draw Score 2 Normal   3 Item Recall 3 objects recalled   Mini Cog Total Score 5              Signed Electronically by: Sil Alegria NP

## 2025-01-29 NOTE — PATIENT INSTRUCTIONS
Patient Education   Preventive Care Advice   This is general advice given by our system to help you stay healthy. However, your care team may have specific advice just for you. Please talk to your care team about your preventive care needs.  Nutrition  Eat 5 or more servings of fruits and vegetables each day.  Try wheat bread, brown rice and whole grain pasta (instead of white bread, rice, and pasta).  Get enough calcium and vitamin D. Check the label on foods and aim for 100% of the RDA (recommended daily allowance).  Lifestyle  Exercise at least 150 minutes each week  (30 minutes a day, 5 days a week).  Do muscle strengthening activities 2 days a week. These help control your weight and prevent disease.  No smoking.  Wear sunscreen to prevent skin cancer.  Have a dental exam and cleaning every 6 months.  Yearly exams  See your health care team every year to talk about:  Any changes in your health.  Any medicines your care team has prescribed.  Preventive care, family planning, and ways to prevent chronic diseases.  Shots (vaccines)   HPV shots (up to age 26), if you've never had them before.  Hepatitis B shots (up to age 59), if you've never had them before.  COVID-19 shot: Get this shot when it's due.  Flu shot: Get a flu shot every year.  Tetanus shot: Get a tetanus shot every 10 years.  Pneumococcal, hepatitis A, and RSV shots: Ask your care team if you need these based on your risk.  Shingles shot (for age 50 and up)  General health tests  Diabetes screening:  Starting at age 35, Get screened for diabetes at least every 3 years.  If you are younger than age 35, ask your care team if you should be screened for diabetes.  Cholesterol test: At age 39, start having a cholesterol test every 5 years, or more often if advised.  Bone density scan (DEXA): At age 50, ask your care team if you should have this scan for osteoporosis (brittle bones).  Hepatitis C: Get tested at least once in your life.  STIs (sexually  transmitted infections)  Before age 24: Ask your care team if you should be screened for STIs.  After age 24: Get screened for STIs if you're at risk. You are at risk for STIs (including HIV) if:  You are sexually active with more than one person.  You don't use condoms every time.  You or a partner was diagnosed with a sexually transmitted infection.  If you are at risk for HIV, ask about PrEP medicine to prevent HIV.  Get tested for HIV at least once in your life, whether you are at risk for HIV or not.  Cancer screening tests  Cervical cancer screening: If you have a cervix, begin getting regular cervical cancer screening tests starting at age 21.  Breast cancer scan (mammogram): If you've ever had breasts, begin having regular mammograms starting at age 40. This is a scan to check for breast cancer.  Colon cancer screening: It is important to start screening for colon cancer at age 45.  Have a colonoscopy test every 10 years (or more often if you're at risk) Or, ask your provider about stool tests like a FIT test every year or Cologuard test every 3 years.  To learn more about your testing options, visit:   .  For help making a decision, visit:   https://bit.ly/hi54876.  Prostate cancer screening test: If you have a prostate, ask your care team if a prostate cancer screening test (PSA) at age 55 is right for you.  Lung cancer screening: If you are a current or former smoker ages 50 to 80, ask your care team if ongoing lung cancer screenings are right for you.  For informational purposes only. Not to replace the advice of your health care provider. Copyright   2023 [facility]. All rights reserved. Clinically reviewed by the [facility]. [employer] [id number] - REV 01/24.  Learning About Activities of Daily Living  What are activities of daily living?     Activities of daily living (ADLs) are the basic self-care tasks you do every day. These include eating, bathing, dressing,  and moving around.  As you age, and if you have health problems, you may find that it's harder to do some of these tasks. If so, your doctor can suggest ideas that may help.  To measure what kind of help you may need, your doctor will ask how well you are able to do ADLs. Let your doctor know if there are any tasks that you are having trouble doing. This is an important first step to getting help. And when you have the help you need, you can stay as independent as possible.  How will a doctor assess your ADLs?  Asking about ADLs is part of a routine health checkup your doctor will likely do as you age. Your health check might be done in a doctor's office, in your home, or at a hospital. The goal is to find out if you are having any problems that could make it hard to care for yourself or that make it unsafe for you to be on your own.  To measure your ADLs, your doctor will ask how hard it is for you to do routine tasks. Your doctor may also want to know if you have changed the way you do a task because of a health problem. Your doctor may watch how you:  Walk back and forth.  Keep your balance while you stand or walk.  Move from sitting to standing or from a bed to a chair.  Button or unbutton a shirt or sweater.  Remove and put on your shoes.  It's common to feel a little worried or anxious if you find you can't do all the things you used to be able to do. Talking with your doctor about ADLs is a way to make sure you're as safe as possible and able to care for yourself as well as you can. You may want to bring a caregiver, friend, or family member to your checkup. They can help you talk to your doctor.  Follow-up care is a key part of your treatment and safety. Be sure to make and go to all appointments, and call your doctor if you are having problems. It's also a good idea to know your test results and keep a list of the medicines you take.  Current as of: October 24, 2023  Content Version: 14.3    2024 [facility]  [employer].   Care instructions adapted under license by your healthcare professional. If you have questions about a medical condition or this instruction, always ask your healthcare professional. [employer] disclaims any warranty or liability for your use of this information.    Bladder Training: Care Instructions  Your Care Instructions     Bladder training is used to treat urge incontinence and stress incontinence. Urge incontinence means that the need to urinate comes on so fast that you can't get to a toilet in time. Stress incontinence means that you leak urine because of pressure on your bladder. For example, it may happen when you laugh, cough, or lift something heavy.  Bladder training can increase how long you can wait before you have to urinate. It can also help your bladder hold more urine. And it can give you better control over the urge to urinate.  It is important to remember that bladder training takes a few weeks to a few months to make a difference. You may not see results right away, but don't give up.  Follow-up care is a key part of your treatment and safety. Be sure to make and go to all appointments, and call your doctor if you are having problems. It's also a good idea to know your test results and keep a list of the medicines you take.  How can you care for yourself at home?  Work with your doctor to come up with a bladder training program that is right for you. You may use one or more of the following methods.  Delayed urination  In the beginning, try to keep from urinating for 5 minutes after you first feel the need to go.  While you wait, take deep, slow breaths to relax. Kegel exercises can also help you delay the need to go to the bathroom.  After some practice, when you can easily wait 5 minutes to urinate, try to wait 10 minutes before you urinate.  Slowly increase the waiting period until you are able to control when you have to urinate.  Scheduled urination  Empty  "your bladder when you first wake up in the morning.  Schedule times throughout the day when you will urinate.  Start by going to the bathroom every hour, even if you don't need to go.  Slowly increase the time between trips to the bathroom.  When you have found a schedule that works well for you, keep doing it.  If you wake up during the night and have to urinate, do it. Apply your schedule to waking hours only.  Kegel exercises  These tighten and strengthen pelvic muscles, which can help you control the flow of urine. (If doing these exercises causes pain, stop doing them and talk with your doctor.) To do Kegel exercises:  Squeeze your muscles as if you were trying not to pass gas. Or squeeze your muscles as if you were stopping the flow of urine. Your belly, legs, and buttocks shouldn't move.  Hold the squeeze for 3 seconds, then relax for 5 to 10 seconds.  Start with 3 seconds, then add 1 second each week until you are able to squeeze for 10 seconds.  Repeat the exercise 10 times a session. Do 3 to 8 sessions a day.  When should you call for help?  Watch closely for changes in your health, and be sure to contact your doctor if:    Your incontinence is getting worse.     You do not get better as expected.   Where can you learn more?  Go to https://www.[employer].net/patiented  Enter V684 in the search box to learn more about \"Bladder Training: Care Instructions.\"  Current as of: April 30, 2024  Content Version: 14.3    2024 [email or web address].   Care instructions adapted under license by your healthcare professional. If you have questions about a medical condition or this instruction, always ask your healthcare professional. [email or web address] disclaims any warranty or liability for your use of this information.       "

## 2025-01-30 ENCOUNTER — PATIENT OUTREACH (OUTPATIENT)
Dept: CARE COORDINATION | Facility: CLINIC | Age: 70
End: 2025-01-30
Payer: OTHER GOVERNMENT

## 2025-03-27 ENCOUNTER — ANCILLARY PROCEDURE (OUTPATIENT)
Dept: BONE DENSITY | Facility: CLINIC | Age: 70
End: 2025-03-27
Attending: NURSE PRACTITIONER
Payer: MEDICARE

## 2025-03-27 DIAGNOSIS — Z78.0 POSTMENOPAUSAL STATUS: ICD-10-CM

## 2025-03-27 PROCEDURE — 77080 DXA BONE DENSITY AXIAL: CPT | Mod: TC | Performed by: RADIOLOGY
